# Patient Record
Sex: FEMALE | URBAN - METROPOLITAN AREA
[De-identification: names, ages, dates, MRNs, and addresses within clinical notes are randomized per-mention and may not be internally consistent; named-entity substitution may affect disease eponyms.]

---

## 2024-08-31 ENCOUNTER — APPOINTMENT (OUTPATIENT)
Dept: RADIOLOGY | Facility: MEDICAL CENTER | Age: 49
DRG: 982 | End: 2024-08-31
Attending: STUDENT IN AN ORGANIZED HEALTH CARE EDUCATION/TRAINING PROGRAM
Payer: COMMERCIAL

## 2024-08-31 ENCOUNTER — APPOINTMENT (OUTPATIENT)
Dept: RADIOLOGY | Facility: MEDICAL CENTER | Age: 49
DRG: 982 | End: 2024-08-31
Payer: COMMERCIAL

## 2024-08-31 ENCOUNTER — HOSPITAL ENCOUNTER (INPATIENT)
Facility: MEDICAL CENTER | Age: 49
End: 2024-08-31
Attending: STUDENT IN AN ORGANIZED HEALTH CARE EDUCATION/TRAINING PROGRAM | Admitting: STUDENT IN AN ORGANIZED HEALTH CARE EDUCATION/TRAINING PROGRAM

## 2024-08-31 VITALS
OXYGEN SATURATION: 92 % | SYSTOLIC BLOOD PRESSURE: 130 MMHG | WEIGHT: 220 LBS | RESPIRATION RATE: 24 BRPM | DIASTOLIC BLOOD PRESSURE: 97 MMHG | BODY MASS INDEX: 36.65 KG/M2 | HEIGHT: 65 IN | TEMPERATURE: 96.3 F | HEART RATE: 77 BPM

## 2024-08-31 DIAGNOSIS — R09.02 HYPOXIA: ICD-10-CM

## 2024-08-31 DIAGNOSIS — S32.009A CLOSED FRACTURE DISLOCATION OF LUMBAR SPINE, INITIAL ENCOUNTER (HCC): ICD-10-CM

## 2024-08-31 DIAGNOSIS — S22.42XA CLOSED FRACTURE OF MULTIPLE RIBS OF LEFT SIDE, INITIAL ENCOUNTER: ICD-10-CM

## 2024-08-31 DIAGNOSIS — Z78.9 NO CONTRAINDICATION TO DEEP VEIN THROMBOSIS (DVT) PROPHYLAXIS: ICD-10-CM

## 2024-08-31 DIAGNOSIS — J43.9 PULMONARY EMPHYSEMA, UNSPECIFIED EMPHYSEMA TYPE (HCC): ICD-10-CM

## 2024-08-31 DIAGNOSIS — S82.141A CLOSED FRACTURE OF RIGHT TIBIAL PLATEAU, INITIAL ENCOUNTER: ICD-10-CM

## 2024-08-31 DIAGNOSIS — S27.0XXA TRAUMATIC PNEUMOTHORAX, INITIAL ENCOUNTER: ICD-10-CM

## 2024-08-31 PROBLEM — S82.143A TIBIAL PLATEAU FRACTURE: Status: ACTIVE | Noted: 2024-08-31

## 2024-08-31 PROBLEM — K76.0 FATTY LIVER: Status: ACTIVE | Noted: 2024-08-31

## 2024-08-31 PROBLEM — T14.90XA TRAUMA: Status: ACTIVE | Noted: 2024-08-31

## 2024-08-31 PROBLEM — S22.49XA CLOSED FRACTURE OF MULTIPLE RIBS: Status: ACTIVE | Noted: 2024-08-31

## 2024-08-31 PROBLEM — S22.009A FRACTURE OF THORACIC TRANSVERSE PROCESS (HCC): Status: ACTIVE | Noted: 2024-08-31

## 2024-08-31 PROBLEM — S22.009A FRACTURE OF THORACIC TRANSVERSE PROCESS (HCC): Status: RESOLVED | Noted: 2024-08-31 | Resolved: 2024-08-31

## 2024-08-31 LAB
ABO GROUP BLD: NORMAL
ALBUMIN SERPL BCP-MCNC: 4 G/DL (ref 3.2–4.9)
ALBUMIN/GLOB SERPL: 1.5 G/DL
ALP SERPL-CCNC: 72 U/L (ref 30–99)
ALT SERPL-CCNC: 18 U/L (ref 2–50)
ANION GAP SERPL CALC-SCNC: 12 MMOL/L (ref 7–16)
APTT PPP: 23 SEC (ref 24.7–36)
AST SERPL-CCNC: 26 U/L (ref 12–45)
BILIRUB SERPL-MCNC: 0.2 MG/DL (ref 0.1–1.5)
BLD GP AB SCN SERPL QL: NORMAL
BUN SERPL-MCNC: 13 MG/DL (ref 8–22)
CALCIUM ALBUM COR SERPL-MCNC: 8.7 MG/DL (ref 8.5–10.5)
CALCIUM SERPL-MCNC: 8.7 MG/DL (ref 8.5–10.5)
CHLORIDE SERPL-SCNC: 108 MMOL/L (ref 96–112)
CO2 SERPL-SCNC: 22 MMOL/L (ref 20–33)
CREAT SERPL-MCNC: 1.1 MG/DL (ref 0.5–1.4)
ERYTHROCYTE [DISTWIDTH] IN BLOOD BY AUTOMATED COUNT: 49.6 FL (ref 35.9–50)
ETHANOL BLD-MCNC: <10.1 MG/DL
GFR SERPLBLD CREATININE-BSD FMLA CKD-EPI: 61 ML/MIN/1.73 M 2
GLOBULIN SER CALC-MCNC: 2.6 G/DL (ref 1.9–3.5)
GLUCOSE SERPL-MCNC: 130 MG/DL (ref 65–99)
HCG SERPL QL: NEGATIVE
HCT VFR BLD AUTO: 40.7 % (ref 37–47)
HGB BLD-MCNC: 13.7 G/DL (ref 12–16)
INR PPP: 1 (ref 0.87–1.13)
MCH RBC QN AUTO: 30.2 PG (ref 27–33)
MCHC RBC AUTO-ENTMCNC: 33.7 G/DL (ref 32.2–35.5)
MCV RBC AUTO: 89.6 FL (ref 81.4–97.8)
PLATELET # BLD AUTO: 309 K/UL (ref 164–446)
PMV BLD AUTO: 10.4 FL (ref 9–12.9)
POTASSIUM SERPL-SCNC: 3.7 MMOL/L (ref 3.6–5.5)
PROT SERPL-MCNC: 6.6 G/DL (ref 6–8.2)
PROTHROMBIN TIME: 13.3 SEC (ref 12–14.6)
RBC # BLD AUTO: 4.54 M/UL (ref 4.2–5.4)
RH BLD: NORMAL
SODIUM SERPL-SCNC: 142 MMOL/L (ref 135–145)
WBC # BLD AUTO: 17 K/UL (ref 4.8–10.8)

## 2024-08-31 PROCEDURE — 99291 CRITICAL CARE FIRST HOUR: CPT

## 2024-08-31 PROCEDURE — 86901 BLOOD TYPING SEROLOGIC RH(D): CPT

## 2024-08-31 PROCEDURE — 71260 CT THORAX DX C+: CPT

## 2024-08-31 PROCEDURE — 80053 COMPREHEN METABOLIC PANEL: CPT

## 2024-08-31 PROCEDURE — 700105 HCHG RX REV CODE 258: Performed by: NURSE PRACTITIONER

## 2024-08-31 PROCEDURE — 85730 THROMBOPLASTIN TIME PARTIAL: CPT

## 2024-08-31 PROCEDURE — 700101 HCHG RX REV CODE 250: Performed by: NURSE PRACTITIONER

## 2024-08-31 PROCEDURE — 99222 1ST HOSP IP/OBS MODERATE 55: CPT | Performed by: STUDENT IN AN ORGANIZED HEALTH CARE EDUCATION/TRAINING PROGRAM

## 2024-08-31 PROCEDURE — G0390 TRAUMA RESPONS W/HOSP CRITI: HCPCS

## 2024-08-31 PROCEDURE — 84703 CHORIONIC GONADOTROPIN ASSAY: CPT

## 2024-08-31 PROCEDURE — 72131 CT LUMBAR SPINE W/O DYE: CPT

## 2024-08-31 PROCEDURE — 70450 CT HEAD/BRAIN W/O DYE: CPT

## 2024-08-31 PROCEDURE — 700111 HCHG RX REV CODE 636 W/ 250 OVERRIDE (IP): Mod: JZ | Performed by: STUDENT IN AN ORGANIZED HEALTH CARE EDUCATION/TRAINING PROGRAM

## 2024-08-31 PROCEDURE — 86850 RBC ANTIBODY SCREEN: CPT

## 2024-08-31 PROCEDURE — 96374 THER/PROPH/DIAG INJ IV PUSH: CPT

## 2024-08-31 PROCEDURE — 700117 HCHG RX CONTRAST REV CODE 255: Performed by: STUDENT IN AN ORGANIZED HEALTH CARE EDUCATION/TRAINING PROGRAM

## 2024-08-31 PROCEDURE — 73600 X-RAY EXAM OF ANKLE: CPT | Mod: RT

## 2024-08-31 PROCEDURE — 73560 X-RAY EXAM OF KNEE 1 OR 2: CPT | Mod: RT

## 2024-08-31 PROCEDURE — 72128 CT CHEST SPINE W/O DYE: CPT

## 2024-08-31 PROCEDURE — 73700 CT LOWER EXTREMITY W/O DYE: CPT | Mod: RT

## 2024-08-31 PROCEDURE — 86900 BLOOD TYPING SEROLOGIC ABO: CPT

## 2024-08-31 PROCEDURE — A9270 NON-COVERED ITEM OR SERVICE: HCPCS | Performed by: NURSE PRACTITIONER

## 2024-08-31 PROCEDURE — 700102 HCHG RX REV CODE 250 W/ 637 OVERRIDE(OP): Performed by: NURSE PRACTITIONER

## 2024-08-31 PROCEDURE — 72125 CT NECK SPINE W/O DYE: CPT

## 2024-08-31 PROCEDURE — 36415 COLL VENOUS BLD VENIPUNCTURE: CPT

## 2024-08-31 PROCEDURE — 85027 COMPLETE CBC AUTOMATED: CPT

## 2024-08-31 PROCEDURE — 770022 HCHG ROOM/CARE - ICU (200)

## 2024-08-31 PROCEDURE — 85610 PROTHROMBIN TIME: CPT

## 2024-08-31 PROCEDURE — 82077 ASSAY SPEC XCP UR&BREATH IA: CPT

## 2024-08-31 PROCEDURE — 73590 X-RAY EXAM OF LOWER LEG: CPT | Mod: LT

## 2024-08-31 PROCEDURE — 71045 X-RAY EXAM CHEST 1 VIEW: CPT

## 2024-08-31 RX ORDER — AMOXICILLIN 250 MG
1 CAPSULE ORAL
Status: DISCONTINUED | OUTPATIENT
Start: 2024-08-31 | End: 2024-09-09 | Stop reason: HOSPADM

## 2024-08-31 RX ORDER — ACETAMINOPHEN 500 MG
1000 TABLET ORAL EVERY 6 HOURS
Status: DISPENSED | OUTPATIENT
Start: 2024-08-31 | End: 2024-09-05

## 2024-08-31 RX ORDER — HYDROMORPHONE HYDROCHLORIDE 1 MG/ML
.5-1 INJECTION, SOLUTION INTRAMUSCULAR; INTRAVENOUS; SUBCUTANEOUS
Status: DISCONTINUED | OUTPATIENT
Start: 2024-08-31 | End: 2024-09-05

## 2024-08-31 RX ORDER — ONDANSETRON 2 MG/ML
4 INJECTION INTRAMUSCULAR; INTRAVENOUS EVERY 4 HOURS PRN
Status: DISCONTINUED | OUTPATIENT
Start: 2024-08-31 | End: 2024-09-09 | Stop reason: HOSPADM

## 2024-08-31 RX ORDER — GABAPENTIN 100 MG/1
100 CAPSULE ORAL EVERY 8 HOURS
Status: DISCONTINUED | OUTPATIENT
Start: 2024-08-31 | End: 2024-09-01

## 2024-08-31 RX ORDER — SODIUM CHLORIDE, SODIUM LACTATE, POTASSIUM CHLORIDE, CALCIUM CHLORIDE 600; 310; 30; 20 MG/100ML; MG/100ML; MG/100ML; MG/100ML
INJECTION, SOLUTION INTRAVENOUS CONTINUOUS
Status: DISCONTINUED | OUTPATIENT
Start: 2024-08-31 | End: 2024-09-01

## 2024-08-31 RX ORDER — ONDANSETRON 4 MG/1
4 TABLET, ORALLY DISINTEGRATING ORAL EVERY 4 HOURS PRN
Status: DISCONTINUED | OUTPATIENT
Start: 2024-08-31 | End: 2024-09-09 | Stop reason: HOSPADM

## 2024-08-31 RX ORDER — CELECOXIB 100 MG/1
200 CAPSULE ORAL DAILY
Status: COMPLETED | OUTPATIENT
Start: 2024-09-01 | End: 2024-09-05

## 2024-08-31 RX ORDER — METAXALONE 800 MG/1
800 TABLET ORAL 3 TIMES DAILY
Status: DISCONTINUED | OUTPATIENT
Start: 2024-08-31 | End: 2024-09-09 | Stop reason: HOSPADM

## 2024-08-31 RX ORDER — ACETAMINOPHEN 500 MG
1000 TABLET ORAL EVERY 6 HOURS PRN
Status: DISCONTINUED | OUTPATIENT
Start: 2024-09-05 | End: 2024-09-09 | Stop reason: HOSPADM

## 2024-08-31 RX ORDER — BISACODYL 10 MG
10 SUPPOSITORY, RECTAL RECTAL
Status: DISCONTINUED | OUTPATIENT
Start: 2024-08-31 | End: 2024-09-09 | Stop reason: HOSPADM

## 2024-08-31 RX ORDER — DOCUSATE SODIUM 100 MG/1
100 CAPSULE, LIQUID FILLED ORAL 2 TIMES DAILY
Status: DISCONTINUED | OUTPATIENT
Start: 2024-08-31 | End: 2024-09-09 | Stop reason: HOSPADM

## 2024-08-31 RX ORDER — POLYETHYLENE GLYCOL 3350 17 G/17G
1 POWDER, FOR SOLUTION ORAL 2 TIMES DAILY
Status: DISCONTINUED | OUTPATIENT
Start: 2024-08-31 | End: 2024-09-09 | Stop reason: HOSPADM

## 2024-08-31 RX ORDER — CELECOXIB 200 MG/1
200 CAPSULE ORAL
Status: DISCONTINUED | OUTPATIENT
Start: 2024-09-06 | End: 2024-09-09 | Stop reason: HOSPADM

## 2024-08-31 RX ORDER — OXYCODONE HYDROCHLORIDE 10 MG/1
10 TABLET ORAL
Status: DISCONTINUED | OUTPATIENT
Start: 2024-08-31 | End: 2024-09-09 | Stop reason: HOSPADM

## 2024-08-31 RX ORDER — LIDOCAINE 4 G/G
1-3 PATCH TOPICAL EVERY 24 HOURS
Status: DISCONTINUED | OUTPATIENT
Start: 2024-08-31 | End: 2024-09-09 | Stop reason: HOSPADM

## 2024-08-31 RX ORDER — SODIUM PHOSPHATE,MONO-DIBASIC 19G-7G/118
1 ENEMA (ML) RECTAL
Status: DISCONTINUED | OUTPATIENT
Start: 2024-08-31 | End: 2024-09-09 | Stop reason: HOSPADM

## 2024-08-31 RX ORDER — ENOXAPARIN SODIUM 100 MG/ML
40 INJECTION SUBCUTANEOUS DAILY
Status: DISCONTINUED | OUTPATIENT
Start: 2024-09-01 | End: 2024-09-01

## 2024-08-31 RX ORDER — AMOXICILLIN 250 MG
1 CAPSULE ORAL NIGHTLY
Status: DISCONTINUED | OUTPATIENT
Start: 2024-08-31 | End: 2024-09-09 | Stop reason: HOSPADM

## 2024-08-31 RX ORDER — OXYCODONE HYDROCHLORIDE 5 MG/1
5 TABLET ORAL
Status: DISCONTINUED | OUTPATIENT
Start: 2024-08-31 | End: 2024-09-09 | Stop reason: HOSPADM

## 2024-08-31 RX ADMIN — OXYCODONE HYDROCHLORIDE 10 MG: 10 TABLET ORAL at 19:21

## 2024-08-31 RX ADMIN — ACETAMINOPHEN 1000 MG: 500 TABLET ORAL at 19:20

## 2024-08-31 RX ADMIN — METAXALONE 800 MG: 800 TABLET ORAL at 19:21

## 2024-08-31 RX ADMIN — GABAPENTIN 100 MG: 100 CAPSULE ORAL at 22:26

## 2024-08-31 RX ADMIN — LIDOCAINE 3 PATCH: 4 PATCH TOPICAL at 19:37

## 2024-08-31 RX ADMIN — IOHEXOL 100 ML: 350 INJECTION, SOLUTION INTRAVENOUS at 17:25

## 2024-08-31 RX ADMIN — FENTANYL CITRATE 50 MCG: 50 INJECTION, SOLUTION INTRAMUSCULAR; INTRAVENOUS at 18:11

## 2024-08-31 RX ADMIN — SODIUM CHLORIDE, POTASSIUM CHLORIDE, SODIUM LACTATE AND CALCIUM CHLORIDE: 600; 310; 30; 20 INJECTION, SOLUTION INTRAVENOUS at 19:43

## 2024-08-31 ASSESSMENT — PAIN DESCRIPTION - PAIN TYPE
TYPE: ACUTE PAIN

## 2024-09-01 ENCOUNTER — APPOINTMENT (OUTPATIENT)
Dept: RADIOLOGY | Facility: MEDICAL CENTER | Age: 49
DRG: 982 | End: 2024-09-01
Payer: COMMERCIAL

## 2024-09-01 ENCOUNTER — APPOINTMENT (OUTPATIENT)
Dept: RADIOLOGY | Facility: MEDICAL CENTER | Age: 49
DRG: 982 | End: 2024-09-01
Attending: NURSE PRACTITIONER
Payer: COMMERCIAL

## 2024-09-01 PROBLEM — J44.9 COPD (CHRONIC OBSTRUCTIVE PULMONARY DISEASE) (HCC): Status: ACTIVE | Noted: 2024-09-01

## 2024-09-01 LAB
ABO + RH BLD: NORMAL
ALBUMIN SERPL BCP-MCNC: 3.6 G/DL (ref 3.2–4.9)
ALBUMIN/GLOB SERPL: 1.4 G/DL
ALP SERPL-CCNC: 62 U/L (ref 30–99)
ALT SERPL-CCNC: 18 U/L (ref 2–50)
ANION GAP SERPL CALC-SCNC: 11 MMOL/L (ref 7–16)
AST SERPL-CCNC: 23 U/L (ref 12–45)
BASOPHILS # BLD AUTO: 0.1 % (ref 0–1.8)
BASOPHILS # BLD: 0.02 K/UL (ref 0–0.12)
BILIRUB SERPL-MCNC: 0.3 MG/DL (ref 0.1–1.5)
BUN SERPL-MCNC: 13 MG/DL (ref 8–22)
CALCIUM ALBUM COR SERPL-MCNC: 8.8 MG/DL (ref 8.5–10.5)
CALCIUM SERPL-MCNC: 8.5 MG/DL (ref 8.5–10.5)
CHLORIDE SERPL-SCNC: 106 MMOL/L (ref 96–112)
CO2 SERPL-SCNC: 23 MMOL/L (ref 20–33)
CREAT SERPL-MCNC: 0.62 MG/DL (ref 0.5–1.4)
EOSINOPHIL # BLD AUTO: 0.01 K/UL (ref 0–0.51)
EOSINOPHIL NFR BLD: 0.1 % (ref 0–6.9)
ERYTHROCYTE [DISTWIDTH] IN BLOOD BY AUTOMATED COUNT: 51.4 FL (ref 35.9–50)
GFR SERPLBLD CREATININE-BSD FMLA CKD-EPI: 107 ML/MIN/1.73 M 2
GLOBULIN SER CALC-MCNC: 2.5 G/DL (ref 1.9–3.5)
GLUCOSE SERPL-MCNC: 116 MG/DL (ref 65–99)
HCT VFR BLD AUTO: 38.2 % (ref 37–47)
HGB BLD-MCNC: 12.4 G/DL (ref 12–16)
IMM GRANULOCYTES # BLD AUTO: 0.07 K/UL (ref 0–0.11)
IMM GRANULOCYTES NFR BLD AUTO: 0.5 % (ref 0–0.9)
LYMPHOCYTES # BLD AUTO: 2.42 K/UL (ref 1–4.8)
LYMPHOCYTES NFR BLD: 15.6 % (ref 22–41)
MCH RBC QN AUTO: 29.7 PG (ref 27–33)
MCHC RBC AUTO-ENTMCNC: 32.5 G/DL (ref 32.2–35.5)
MCV RBC AUTO: 91.6 FL (ref 81.4–97.8)
MONOCYTES # BLD AUTO: 0.95 K/UL (ref 0–0.85)
MONOCYTES NFR BLD AUTO: 6.1 % (ref 0–13.4)
NEUTROPHILS # BLD AUTO: 12.02 K/UL (ref 1.82–7.42)
NEUTROPHILS NFR BLD: 77.6 % (ref 44–72)
NRBC # BLD AUTO: 0 K/UL
NRBC BLD-RTO: 0 /100 WBC (ref 0–0.2)
PLATELET # BLD AUTO: 233 K/UL (ref 164–446)
PMV BLD AUTO: 10.3 FL (ref 9–12.9)
POTASSIUM SERPL-SCNC: 3.6 MMOL/L (ref 3.6–5.5)
PROT SERPL-MCNC: 6.1 G/DL (ref 6–8.2)
RBC # BLD AUTO: 4.17 M/UL (ref 4.2–5.4)
SODIUM SERPL-SCNC: 140 MMOL/L (ref 135–145)
WBC # BLD AUTO: 15.5 K/UL (ref 4.8–10.8)

## 2024-09-01 PROCEDURE — 700102 HCHG RX REV CODE 250 W/ 637 OVERRIDE(OP): Performed by: NURSE PRACTITIONER

## 2024-09-01 PROCEDURE — 94640 AIRWAY INHALATION TREATMENT: CPT

## 2024-09-01 PROCEDURE — 94669 MECHANICAL CHEST WALL OSCILL: CPT

## 2024-09-01 PROCEDURE — 700101 HCHG RX REV CODE 250: Performed by: NURSE PRACTITIONER

## 2024-09-01 PROCEDURE — 99152 MOD SED SAME PHYS/QHP 5/>YRS: CPT

## 2024-09-01 PROCEDURE — 700102 HCHG RX REV CODE 250 W/ 637 OVERRIDE(OP): Performed by: REGISTERED NURSE

## 2024-09-01 PROCEDURE — 99233 SBSQ HOSP IP/OBS HIGH 50: CPT | Mod: FS,25 | Performed by: SURGERY

## 2024-09-01 PROCEDURE — 700111 HCHG RX REV CODE 636 W/ 250 OVERRIDE (IP): Performed by: SURGERY

## 2024-09-01 PROCEDURE — 32551 INSERTION OF CHEST TUBE: CPT

## 2024-09-01 PROCEDURE — C1729 CATH, DRAINAGE: HCPCS

## 2024-09-01 PROCEDURE — 32551 INSERTION OF CHEST TUBE: CPT | Performed by: SURGERY

## 2024-09-01 PROCEDURE — 85025 COMPLETE CBC W/AUTO DIFF WBC: CPT

## 2024-09-01 PROCEDURE — 80053 COMPREHEN METABOLIC PANEL: CPT

## 2024-09-01 PROCEDURE — 700111 HCHG RX REV CODE 636 W/ 250 OVERRIDE (IP)

## 2024-09-01 PROCEDURE — 700111 HCHG RX REV CODE 636 W/ 250 OVERRIDE (IP): Mod: JZ | Performed by: SURGERY

## 2024-09-01 PROCEDURE — 700105 HCHG RX REV CODE 258

## 2024-09-01 PROCEDURE — A9270 NON-COVERED ITEM OR SERVICE: HCPCS | Performed by: REGISTERED NURSE

## 2024-09-01 PROCEDURE — 700105 HCHG RX REV CODE 258: Performed by: NURSE PRACTITIONER

## 2024-09-01 PROCEDURE — 0W9B30Z DRAINAGE OF LEFT PLEURAL CAVITY WITH DRAINAGE DEVICE, PERCUTANEOUS APPROACH: ICD-10-PCS | Performed by: SURGERY

## 2024-09-01 PROCEDURE — A9270 NON-COVERED ITEM OR SERVICE: HCPCS | Performed by: NURSE PRACTITIONER

## 2024-09-01 PROCEDURE — 770022 HCHG ROOM/CARE - ICU (200)

## 2024-09-01 PROCEDURE — 71045 X-RAY EXAM CHEST 1 VIEW: CPT

## 2024-09-01 RX ORDER — GABAPENTIN 100 MG/1
200 CAPSULE ORAL EVERY 8 HOURS
Status: DISCONTINUED | OUTPATIENT
Start: 2024-09-01 | End: 2024-09-03

## 2024-09-01 RX ORDER — PROPOFOL 10 MG/ML
100 INJECTION, EMULSION INTRAVENOUS ONCE
Status: COMPLETED | OUTPATIENT
Start: 2024-09-01 | End: 2024-09-01

## 2024-09-01 RX ORDER — ENOXAPARIN SODIUM 100 MG/ML
40 INJECTION SUBCUTANEOUS EVERY 12 HOURS
Status: DISCONTINUED | OUTPATIENT
Start: 2024-09-01 | End: 2024-09-09 | Stop reason: HOSPADM

## 2024-09-01 RX ORDER — NICOTINE 21 MG/24HR
21 PATCH, TRANSDERMAL 24 HOURS TRANSDERMAL
Status: DISCONTINUED | OUTPATIENT
Start: 2024-09-01 | End: 2024-09-09 | Stop reason: HOSPADM

## 2024-09-01 RX ADMIN — ACETAMINOPHEN 1000 MG: 500 TABLET ORAL at 05:54

## 2024-09-01 RX ADMIN — METAXALONE 800 MG: 800 TABLET ORAL at 05:54

## 2024-09-01 RX ADMIN — METAXALONE 800 MG: 800 TABLET ORAL at 17:33

## 2024-09-01 RX ADMIN — OXYCODONE HYDROCHLORIDE 10 MG: 10 TABLET ORAL at 01:03

## 2024-09-01 RX ADMIN — DOCUSATE SODIUM 100 MG: 100 CAPSULE, LIQUID FILLED ORAL at 17:33

## 2024-09-01 RX ADMIN — GABAPENTIN 200 MG: 100 CAPSULE ORAL at 14:15

## 2024-09-01 RX ADMIN — GABAPENTIN 100 MG: 100 CAPSULE ORAL at 05:54

## 2024-09-01 RX ADMIN — ACETAMINOPHEN 1000 MG: 500 TABLET ORAL at 23:48

## 2024-09-01 RX ADMIN — LIDOCAINE 3 PATCH: 4 PATCH TOPICAL at 17:32

## 2024-09-01 RX ADMIN — NICOTINE TRANSDERMAL SYSTEM 21 MG: 21 PATCH, EXTENDED RELEASE TRANSDERMAL at 09:48

## 2024-09-01 RX ADMIN — ENOXAPARIN SODIUM 40 MG: 100 INJECTION SUBCUTANEOUS at 17:32

## 2024-09-01 RX ADMIN — PROPOFOL 100 MG: 10 INJECTION, EMULSION INTRAVENOUS at 04:27

## 2024-09-01 RX ADMIN — SODIUM CHLORIDE, POTASSIUM CHLORIDE, SODIUM LACTATE AND CALCIUM CHLORIDE: 600; 310; 30; 20 INJECTION, SOLUTION INTRAVENOUS at 09:56

## 2024-09-01 RX ADMIN — ACETAMINOPHEN 1000 MG: 500 TABLET ORAL at 00:53

## 2024-09-01 RX ADMIN — CEFAZOLIN 2 G: 2 INJECTION, POWDER, FOR SOLUTION INTRAMUSCULAR; INTRAVENOUS at 05:58

## 2024-09-01 RX ADMIN — METAXALONE 800 MG: 800 TABLET ORAL at 12:15

## 2024-09-01 RX ADMIN — CELECOXIB 200 MG: 100 CAPSULE ORAL at 05:54

## 2024-09-01 RX ADMIN — OXYCODONE HYDROCHLORIDE 10 MG: 10 TABLET ORAL at 21:25

## 2024-09-01 RX ADMIN — OXYCODONE HYDROCHLORIDE 10 MG: 10 TABLET ORAL at 09:54

## 2024-09-01 RX ADMIN — ACETAMINOPHEN 1000 MG: 500 TABLET ORAL at 12:15

## 2024-09-01 RX ADMIN — MAGNESIUM HYDROXIDE 30 ML: 1200 LIQUID ORAL at 17:33

## 2024-09-01 RX ADMIN — ENOXAPARIN SODIUM 40 MG: 100 INJECTION SUBCUTANEOUS at 09:48

## 2024-09-01 RX ADMIN — POLYETHYLENE GLYCOL 3350 1 PACKET: 17 POWDER, FOR SOLUTION ORAL at 17:33

## 2024-09-01 RX ADMIN — GABAPENTIN 200 MG: 100 CAPSULE ORAL at 21:24

## 2024-09-01 ASSESSMENT — LIFESTYLE VARIABLES
HAVE PEOPLE ANNOYED YOU BY CRITICIZING YOUR DRINKING: NO
HOW MANY TIMES IN THE PAST YEAR HAVE YOU HAD 5 OR MORE DRINKS IN A DAY: 0
CONSUMPTION TOTAL: NEGATIVE
AVERAGE NUMBER OF DAYS PER WEEK YOU HAVE A DRINK CONTAINING ALCOHOL: 3
HAVE YOU EVER FELT YOU SHOULD CUT DOWN ON YOUR DRINKING: NO
ALCOHOL_USE: YES
TOTAL SCORE: 0
TOTAL SCORE: 0
ON A TYPICAL DAY WHEN YOU DRINK ALCOHOL HOW MANY DRINKS DO YOU HAVE: 2
EVER FELT BAD OR GUILTY ABOUT YOUR DRINKING: NO
TOTAL SCORE: 0
EVER HAD A DRINK FIRST THING IN THE MORNING TO STEADY YOUR NERVES TO GET RID OF A HANGOVER: NO

## 2024-09-01 ASSESSMENT — COGNITIVE AND FUNCTIONAL STATUS - GENERAL
DRESSING REGULAR LOWER BODY CLOTHING: A LOT
DRESSING REGULAR UPPER BODY CLOTHING: A LITTLE
HELP NEEDED FOR BATHING: A LITTLE
TOILETING: A LITTLE
SUGGESTED CMS G CODE MODIFIER MOBILITY: CL
TURNING FROM BACK TO SIDE WHILE IN FLAT BAD: A LOT
SUGGESTED CMS G CODE MODIFIER DAILY ACTIVITY: CK
MOVING TO AND FROM BED TO CHAIR: A LOT
MOBILITY SCORE: 13
WALKING IN HOSPITAL ROOM: A LOT
DAILY ACTIVITIY SCORE: 19
MOVING FROM LYING ON BACK TO SITTING ON SIDE OF FLAT BED: A LITTLE
STANDING UP FROM CHAIR USING ARMS: A LOT
CLIMB 3 TO 5 STEPS WITH RAILING: A LOT

## 2024-09-01 ASSESSMENT — PATIENT HEALTH QUESTIONNAIRE - PHQ9
1. LITTLE INTEREST OR PLEASURE IN DOING THINGS: NOT AT ALL
2. FEELING DOWN, DEPRESSED, IRRITABLE, OR HOPELESS: NOT AT ALL
SUM OF ALL RESPONSES TO PHQ9 QUESTIONS 1 AND 2: 0

## 2024-09-01 ASSESSMENT — PAIN DESCRIPTION - PAIN TYPE
TYPE: ACUTE PAIN

## 2024-09-01 ASSESSMENT — ENCOUNTER SYMPTOMS
CONSTITUTIONAL NEGATIVE: 1
EYES NEGATIVE: 1
FOCAL WEAKNESS: 0
PSYCHIATRIC NEGATIVE: 1
MUSCULOSKELETAL NEGATIVE: 1
DOUBLE VISION: 0
GASTROINTESTINAL NEGATIVE: 1
CARDIOVASCULAR NEGATIVE: 1
BLURRED VISION: 0
NEUROLOGICAL NEGATIVE: 1
DIZZINESS: 0
RESPIRATORY NEGATIVE: 1

## 2024-09-01 ASSESSMENT — COPD QUESTIONNAIRES
DO YOU EVER COUGH UP ANY MUCUS OR PHLEGM?: YES, A FEW DAYS A WEEK OR MONTH
DURING THE PAST 4 WEEKS HOW MUCH DID YOU FEEL SHORT OF BREATH: SOME OF THE TIME

## 2024-09-01 NOTE — ED NOTES
Knee immobilizer placed R leg with assist from RN and tech.   ICU RN at bedside picking up pt with all belongings now.   Jewelry placed in cup with pt sticker on it and placed into pt belongings bag.

## 2024-09-01 NOTE — PROGRESS NOTES
Pt to TICU with TICU RN at 1833    HR: 80  BP: 123/75  SpO2: 93% on 2L NC  RR: 22  Temp: 96.8F  Weight: 100 kg *pt stated*    4 Eyes Skin Assessment Completed by Darlene RN and ROSY Alcazar.    Head WDL  Ears WDL  Nose WDL  Mouth WDL  Neck R neck lipoma, red & blanching  Breast/Chest abrasion to R shoulder  Shoulder Blades WDL  Spine WDL  (R) Arm/Elbow/Hand WDL  (L) Arm/Elbow/Hand small scattered abrasions  Abdomen WDL  Groin WDL  Scrotum/Coccyx/Buttocks WDL  (R) Leg Bruising to shin; scar to R knee; immobilizer in place  (L) Leg scattered abrasions  (R) Heel/Foot/Toe healing abrasion to heel, new abrasion to heel, purple/red & blanching  (L) Heel/Foot/Toe WDL        Devices In Places ECG, Blood Pressure Cuff, Pulse Ox, SCD's, Leg Immobilizer, and Nasal Cannula      Interventions In Place Gray Ear Foams, Heel Mepilex, Sacral Mepilex, TAP System, Pillows, Q2 Turns, Dri-Lauri Pads, and Pressure Redistribution Mattress    Possible Skin Injury No    Pictures Uploaded Into Epic Yes  Wound Consult Placed Yes  RN Wound Prevention Protocol Ordered Yes
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
## 2024-09-01 NOTE — PROGRESS NOTES
I was paged at [phone] to consult on this patient. I arrived at the patient's bedside at 1700.  - right depressed lateral tibial plateau fracture  - definitive plan pending  - Discussed with Dr. Forbes, formal consult to follow

## 2024-09-01 NOTE — ASSESSMENT & PLAN NOTE
Impacted and comminuted lateral tibial plateau fracture. CT showing acute comminuted and depressed fracture of the lateral tibial plateau, extending into the tibial eminence.   Definitive operative reduction and stabilization pending.  Knee immobilizer   Weight bearing status - Definitive plan pending RLE.  Raymond Forbes MD. Orthopedic Surgeon. [facility]. and Juwan Wu MD. Orthopedic Surgeon. [facility].

## 2024-09-01 NOTE — ASSESSMENT & PLAN NOTE
History of COPD reported by patient.  Does not have a pulmonologist. Does not take medications or utilize oxygen.  Home oxygen ordered.   Follow up outpatient with PCP for pulmonary referral.
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
## 2024-09-01 NOTE — CARE PLAN
The patient is Watcher - Medium risk of patient condition declining or worsening    Shift Goals  Clinical Goals: pain management, pulmonary hygiene  Patient Goals: pain managemnet, rest, updates  Family Goals: updates on plan of care    Progress made toward(s) clinical / shift goals:    Problem: Knowledge Deficit - Standard  Goal: Patient and family/care givers will demonstrate understanding of plan of care, disease process/condition, diagnostic tests and medications  Outcome: Progressing     Problem: Pain - Standard  Goal: Alleviation of pain or a reduction in pain to the patient’s comfort goal  Outcome: Progressing     Problem: Fall Risk  Goal: Patient will remain free from falls  Outcome: Progressing     Problem: Communication  Goal: The ability to communicate needs accurately and effectively will improve  Outcome: Progressing     Problem: Chest Tube Management  Goal: Complications related to chest tube will be avoided or minimized  Outcome: Progressing       Patient is not progressing towards the following goals:

## 2024-09-01 NOTE — ED NOTES
Knee immobilizer applied to injured extremity.  CMS checked before and after application. Pt educated on use and care. Pt verbalized understanding.

## 2024-09-01 NOTE — CARE PLAN
The patient is Watcher - Medium risk of patient condition declining or worsening    Shift Goals  Clinical Goals: Pain control, pulmonary hygiene, increase mobility tolerance  Patient Goals: Pain control  Family Goals: MARYAM, none present    Progress made toward(s) clinical / shift goals:    Problem: Knowledge Deficit - Standard  Goal: Patient and family/care givers will demonstrate understanding of plan of care, disease process/condition, diagnostic tests and medications  Outcome: Progressing     Problem: Pain - Standard  Goal: Alleviation of pain or a reduction in pain to the patient’s comfort goal  Outcome: Progressing     Problem: Fall Risk  Goal: Patient will remain free from falls  Outcome: Progressing     Problem: Chest Tube Management  Goal: Complications related to chest tube will be avoided or minimized  Outcome: Progressing     Problem: Skin Integrity  Goal: Skin integrity is maintained or improved  Outcome: Progressing

## 2024-09-01 NOTE — ED NOTES
Bedside report to TICU RN. Pt transported to TICU 927 on monitor. All belongings transported with pt.

## 2024-09-01 NOTE — ASSESSMENT & PLAN NOTE
Fractures of the left anterior sixth and seventh ribs and the left posterior sixth, seventh, eighth, ninth, 10th and 11th ribs..  Aggressive multimodal pain management and pulmonary hygiene. Serial chest radiographs.
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
## 2024-09-01 NOTE — PROGRESS NOTES
0400 Preparing for chest tube placement    0408 30mg propofol given  0410 additional 20mg propofol given  0413 local administered  0414 20mg propofol given  0417 left chest tube placement  0417 20mg propofol given  0419 10mg propofol given    See vital sign flowsheet and procedure navigator for further detail.
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
## 2024-09-01 NOTE — ASSESSMENT & PLAN NOTE
Small left-sided pneumothorax   No chest tube required on admit  Aggressive pulmonary hygiene   Daily chest Xray
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
## 2024-09-01 NOTE — PROGRESS NOTES
0100: pt on 5L nasal canula saturation 88-91%.   0200: Pt O2 demands increasing, O2 saturation 85-87% on 5L nasal canula. Pt repositioned and HOB elevated. Pt encourage to cough and use incentive spirometer. Pt placed on 10L oxy mask, O2 saturation 89-91%.  0240: Pt O2 demands continuing to increase, O2 saturation 83-85%. Pt placed on 15L oxy mask, O2 saturation 89-92%. RT notified.   0300: Pt O2 demands continuing to increase, O2 saturation 84-87%. Pt placed on 15L non-rebreather, O2 saturation 92-97%.RT updated.   0332: Pt on 15L non-rebreather with O2 saturation 87-90%. Updated APRN, orders for STAT chest x-ray.  0342: Chest x-ray received, updated APRN.   0343: Orders for left sided chest tube.

## 2024-09-01 NOTE — ASSESSMENT & PLAN NOTE
Acute nondisplaced fracture of the left transverse process of L2.  Non-operative management.   No bracing required.  Neurosurgery consultation not indicated.  Pain control.

## 2024-09-01 NOTE — PROGRESS NOTES
Hinged knee brace locked at 20 degrees flexion was applied to patient. Pt tolerates fit of brace well at this time.

## 2024-09-01 NOTE — PROCEDURES
DATE OF OPERATION:  9/1/2024    PREOPERATIVE DIAGNOSIS: Left pneumothorax.    POSTOPERATIVE DIAGNOSIS: Left pneumothorax.     PROCEDURE PERFORMED: Left tube thoracostomy.     SURGEON:    Linsey M Wegener, A.P.R.N.    ASSISTANT:    Miah Henriquez M.D.    ANESTHESIA:   local anesthesia and intravenous sedation.    INDICATIONS: The patient is a 49 year-old woman with a left pneumothorax. A left tube thoracostomy was performed.    FINDINGS: left pneumothorax.     SPECIMEN:     None.    ESTIMATED BLOOD LOSS:  Minimal    PROCEDURE: Following informed consent consent, the patient was properly identified and optimally positioned. An anesthetic consisting of local anesthesia and intravenous sedation was administered. The left chest wall was widely prepped with ChloraPrep® and draped into a sterile field.      Local anesthetic was used to infiltrate the chest tube site.   Multiple intercostal nerve blocks were placed above and below the chest tube site dermatome.  A transverse incision was made in the left 5th intercostal space, anterior axillary line and the subcutaneous tissue spread bluntly. The thoracic cavity was accessed bluntly over the rib and a 24 Fr chest tube placed in a posterior apical orientation and secured to the skin with a 0 Ethibond interrupted suture.     The chest tube was connected to closed suction drainage and a sterile dressing was applied. The patient tolerated the procedure well. There were no apparent complications.        ____________________________________     Linsey M Wegener, A.P.R.N.    DD: 9/1/2024  4:32 AM

## 2024-09-02 ENCOUNTER — APPOINTMENT (OUTPATIENT)
Dept: RADIOLOGY | Facility: MEDICAL CENTER | Age: 49
DRG: 982 | End: 2024-09-02
Attending: NURSE PRACTITIONER
Payer: COMMERCIAL

## 2024-09-02 LAB
ALBUMIN SERPL BCP-MCNC: 3.4 G/DL (ref 3.2–4.9)
ALBUMIN/GLOB SERPL: 1.2 G/DL
ALP SERPL-CCNC: 63 U/L (ref 30–99)
ALT SERPL-CCNC: 12 U/L (ref 2–50)
ANION GAP SERPL CALC-SCNC: 10 MMOL/L (ref 7–16)
AST SERPL-CCNC: 19 U/L (ref 12–45)
BASOPHILS # BLD AUTO: 0.3 % (ref 0–1.8)
BASOPHILS # BLD: 0.03 K/UL (ref 0–0.12)
BILIRUB SERPL-MCNC: 0.3 MG/DL (ref 0.1–1.5)
BUN SERPL-MCNC: 13 MG/DL (ref 8–22)
CALCIUM ALBUM COR SERPL-MCNC: 9 MG/DL (ref 8.5–10.5)
CALCIUM SERPL-MCNC: 8.5 MG/DL (ref 8.5–10.5)
CHLORIDE SERPL-SCNC: 102 MMOL/L (ref 96–112)
CO2 SERPL-SCNC: 24 MMOL/L (ref 20–33)
CREAT SERPL-MCNC: 0.67 MG/DL (ref 0.5–1.4)
EOSINOPHIL # BLD AUTO: 0.2 K/UL (ref 0–0.51)
EOSINOPHIL NFR BLD: 1.8 % (ref 0–6.9)
ERYTHROCYTE [DISTWIDTH] IN BLOOD BY AUTOMATED COUNT: 50.5 FL (ref 35.9–50)
GFR SERPLBLD CREATININE-BSD FMLA CKD-EPI: 105 ML/MIN/1.73 M 2
GLOBULIN SER CALC-MCNC: 2.8 G/DL (ref 1.9–3.5)
GLUCOSE SERPL-MCNC: 96 MG/DL (ref 65–99)
HCT VFR BLD AUTO: 36.6 % (ref 37–47)
HGB BLD-MCNC: 12 G/DL (ref 12–16)
IMM GRANULOCYTES # BLD AUTO: 0.04 K/UL (ref 0–0.11)
IMM GRANULOCYTES NFR BLD AUTO: 0.4 % (ref 0–0.9)
LYMPHOCYTES # BLD AUTO: 3.98 K/UL (ref 1–4.8)
LYMPHOCYTES NFR BLD: 35.2 % (ref 22–41)
MAGNESIUM SERPL-MCNC: 2.4 MG/DL (ref 1.5–2.5)
MCH RBC QN AUTO: 29.9 PG (ref 27–33)
MCHC RBC AUTO-ENTMCNC: 32.8 G/DL (ref 32.2–35.5)
MCV RBC AUTO: 91 FL (ref 81.4–97.8)
MONOCYTES # BLD AUTO: 0.59 K/UL (ref 0–0.85)
MONOCYTES NFR BLD AUTO: 5.2 % (ref 0–13.4)
NEUTROPHILS # BLD AUTO: 6.48 K/UL (ref 1.82–7.42)
NEUTROPHILS NFR BLD: 57.1 % (ref 44–72)
NRBC # BLD AUTO: 0 K/UL
NRBC BLD-RTO: 0 /100 WBC (ref 0–0.2)
PHOSPHATE SERPL-MCNC: 3.2 MG/DL (ref 2.5–4.5)
PLATELET # BLD AUTO: 230 K/UL (ref 164–446)
PMV BLD AUTO: 10.8 FL (ref 9–12.9)
POTASSIUM SERPL-SCNC: 4.2 MMOL/L (ref 3.6–5.5)
PROT SERPL-MCNC: 6.2 G/DL (ref 6–8.2)
RBC # BLD AUTO: 4.02 M/UL (ref 4.2–5.4)
SODIUM SERPL-SCNC: 136 MMOL/L (ref 135–145)
WBC # BLD AUTO: 11.3 K/UL (ref 4.8–10.8)

## 2024-09-02 PROCEDURE — A9270 NON-COVERED ITEM OR SERVICE: HCPCS | Performed by: NURSE PRACTITIONER

## 2024-09-02 PROCEDURE — 700102 HCHG RX REV CODE 250 W/ 637 OVERRIDE(OP): Performed by: NURSE PRACTITIONER

## 2024-09-02 PROCEDURE — 80053 COMPREHEN METABOLIC PANEL: CPT

## 2024-09-02 PROCEDURE — 97163 PT EVAL HIGH COMPLEX 45 MIN: CPT

## 2024-09-02 PROCEDURE — 94640 AIRWAY INHALATION TREATMENT: CPT

## 2024-09-02 PROCEDURE — 700102 HCHG RX REV CODE 250 W/ 637 OVERRIDE(OP): Performed by: REGISTERED NURSE

## 2024-09-02 PROCEDURE — 71045 X-RAY EXAM CHEST 1 VIEW: CPT

## 2024-09-02 PROCEDURE — 770022 HCHG ROOM/CARE - ICU (200)

## 2024-09-02 PROCEDURE — 84100 ASSAY OF PHOSPHORUS: CPT

## 2024-09-02 PROCEDURE — 97535 SELF CARE MNGMENT TRAINING: CPT

## 2024-09-02 PROCEDURE — 700111 HCHG RX REV CODE 636 W/ 250 OVERRIDE (IP): Mod: JZ | Performed by: SURGERY

## 2024-09-02 PROCEDURE — 85025 COMPLETE CBC W/AUTO DIFF WBC: CPT

## 2024-09-02 PROCEDURE — 94669 MECHANICAL CHEST WALL OSCILL: CPT

## 2024-09-02 PROCEDURE — 700101 HCHG RX REV CODE 250: Performed by: NURSE PRACTITIONER

## 2024-09-02 PROCEDURE — 99153 MOD SED SAME PHYS/QHP EA: CPT

## 2024-09-02 PROCEDURE — 99233 SBSQ HOSP IP/OBS HIGH 50: CPT | Performed by: SURGERY

## 2024-09-02 PROCEDURE — 94664 DEMO&/EVAL PT USE INHALER: CPT

## 2024-09-02 PROCEDURE — A9270 NON-COVERED ITEM OR SERVICE: HCPCS | Performed by: REGISTERED NURSE

## 2024-09-02 PROCEDURE — 83735 ASSAY OF MAGNESIUM: CPT

## 2024-09-02 PROCEDURE — 97166 OT EVAL MOD COMPLEX 45 MIN: CPT

## 2024-09-02 RX ORDER — IPRATROPIUM BROMIDE AND ALBUTEROL SULFATE 2.5; .5 MG/3ML; MG/3ML
3 SOLUTION RESPIRATORY (INHALATION)
Status: DISCONTINUED | OUTPATIENT
Start: 2024-09-02 | End: 2024-09-09 | Stop reason: HOSPADM

## 2024-09-02 RX ADMIN — OXYCODONE HYDROCHLORIDE 5 MG: 5 TABLET ORAL at 17:05

## 2024-09-02 RX ADMIN — OXYCODONE HYDROCHLORIDE 5 MG: 5 TABLET ORAL at 03:51

## 2024-09-02 RX ADMIN — ENOXAPARIN SODIUM 40 MG: 100 INJECTION SUBCUTANEOUS at 17:56

## 2024-09-02 RX ADMIN — DOCUSATE SODIUM 100 MG: 100 CAPSULE, LIQUID FILLED ORAL at 05:19

## 2024-09-02 RX ADMIN — GABAPENTIN 200 MG: 100 CAPSULE ORAL at 05:19

## 2024-09-02 RX ADMIN — GABAPENTIN 200 MG: 100 CAPSULE ORAL at 15:00

## 2024-09-02 RX ADMIN — GABAPENTIN 200 MG: 100 CAPSULE ORAL at 21:31

## 2024-09-02 RX ADMIN — CELECOXIB 200 MG: 100 CAPSULE ORAL at 05:19

## 2024-09-02 RX ADMIN — POLYETHYLENE GLYCOL 3350 1 PACKET: 17 POWDER, FOR SOLUTION ORAL at 18:00

## 2024-09-02 RX ADMIN — OXYCODONE HYDROCHLORIDE 5 MG: 5 TABLET ORAL at 07:50

## 2024-09-02 RX ADMIN — SENNOSIDES AND DOCUSATE SODIUM 1 TABLET: 50; 8.6 TABLET ORAL at 20:23

## 2024-09-02 RX ADMIN — ACETAMINOPHEN 1000 MG: 500 TABLET ORAL at 18:00

## 2024-09-02 RX ADMIN — METAXALONE 800 MG: 800 TABLET ORAL at 05:19

## 2024-09-02 RX ADMIN — MAGNESIUM HYDROXIDE 30 ML: 1200 LIQUID ORAL at 18:00

## 2024-09-02 RX ADMIN — LIDOCAINE 2 PATCH: 4 PATCH TOPICAL at 18:09

## 2024-09-02 RX ADMIN — ENOXAPARIN SODIUM 40 MG: 100 INJECTION SUBCUTANEOUS at 05:19

## 2024-09-02 RX ADMIN — METAXALONE 800 MG: 800 TABLET ORAL at 18:00

## 2024-09-02 RX ADMIN — ACETAMINOPHEN 1000 MG: 500 TABLET ORAL at 05:19

## 2024-09-02 RX ADMIN — OXYCODONE HYDROCHLORIDE 5 MG: 5 TABLET ORAL at 22:36

## 2024-09-02 RX ADMIN — ACETAMINOPHEN 1000 MG: 500 TABLET ORAL at 12:00

## 2024-09-02 RX ADMIN — NICOTINE TRANSDERMAL SYSTEM 21 MG: 21 PATCH, EXTENDED RELEASE TRANSDERMAL at 05:20

## 2024-09-02 RX ADMIN — DOCUSATE SODIUM 100 MG: 100 CAPSULE, LIQUID FILLED ORAL at 18:00

## 2024-09-02 RX ADMIN — OXYCODONE HYDROCHLORIDE 5 MG: 5 TABLET ORAL at 12:00

## 2024-09-02 RX ADMIN — METAXALONE 800 MG: 800 TABLET ORAL at 12:00

## 2024-09-02 ASSESSMENT — ENCOUNTER SYMPTOMS
NECK PAIN: 0
SHORTNESS OF BREATH: 0
NUMBNESS: 0
WEAKNESS: 0
CHEST TIGHTNESS: 0
BACK PAIN: 0
COUGH: 0
CONSTIPATION: 1
ABDOMINAL PAIN: 0
NAUSEA: 0
MYALGIAS: 1
HEADACHES: 0
STRIDOR: 0
DIZZINESS: 0

## 2024-09-02 ASSESSMENT — COGNITIVE AND FUNCTIONAL STATUS - GENERAL
SUGGESTED CMS G CODE MODIFIER DAILY ACTIVITY: CK
HELP NEEDED FOR BATHING: A LOT
WALKING IN HOSPITAL ROOM: TOTAL
DRESSING REGULAR UPPER BODY CLOTHING: A LITTLE
STANDING UP FROM CHAIR USING ARMS: A LOT
TOILETING: A LOT
TURNING FROM BACK TO SIDE WHILE IN FLAT BAD: A LITTLE
MOVING TO AND FROM BED TO CHAIR: A LOT
SUGGESTED CMS G CODE MODIFIER MOBILITY: CL
DRESSING REGULAR LOWER BODY CLOTHING: A LOT
MOBILITY SCORE: 12
CLIMB 3 TO 5 STEPS WITH RAILING: TOTAL
MOVING FROM LYING ON BACK TO SITTING ON SIDE OF FLAT BED: A LITTLE
DAILY ACTIVITIY SCORE: 17

## 2024-09-02 ASSESSMENT — GAIT ASSESSMENTS: GAIT LEVEL OF ASSIST: UNABLE TO PARTICIPATE

## 2024-09-02 ASSESSMENT — PAIN DESCRIPTION - PAIN TYPE
TYPE: ACUTE PAIN

## 2024-09-02 ASSESSMENT — ACTIVITIES OF DAILY LIVING (ADL): TOILETING: INDEPENDENT
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
## 2024-09-02 NOTE — CARE PLAN
Problem: Humidified High Flow Nasal Cannula  Goal: Maintain adequate oxygenation dependent on patient condition  Description: Target End Date:  resolve prior to discharge or when underlying condition is resolved/stabilized    1.  Implement humidified high flow oxygen therapy  2.  Titrate high flow oxygen to maintain appropriate SpO2  Outcome: Progressing         Respiratory Update  Therapy: HHFNC  Settings: 30L/45%    Pt tolerating current therapy well with no adverse reactions, will continue to monitor and wean as tolerated     Problem: Hyperinflation  Goal: Prevent or improve atelectasis  Description: Target End Date:  3 to 4 days    1. Instruct incentive spirometry usage  2.  Perform hyperinflation therapy as indicated  Outcome: Progressing        Respiratory Update  Treatment Modality: PEP  Frequency: QID  IS 1000    Pt tolerating current treatments well with no adverse reactions, will continue to monitor
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
## 2024-09-02 NOTE — CARE PLAN
The patient is Watcher - Medium risk of patient condition declining or worsening    Shift Goals  Clinical Goals: pain control, IS, mobility, pulmonary hygiene  Patient Goals: pain control, decrease O2 demands  Family Goals: updates    Progress made toward(s) clinical / shift goals:    Problem: Knowledge Deficit - Standard  Goal: Patient and family/care givers will demonstrate understanding of plan of care, disease process/condition, diagnostic tests and medications  Outcome: Progressing     Problem: Pain - Standard  Goal: Alleviation of pain or a reduction in pain to the patient’s comfort goal  Outcome: Progressing     Problem: Fall Risk  Goal: Patient will remain free from falls  Outcome: Progressing     Problem: Communication  Goal: The ability to communicate needs accurately and effectively will improve  Outcome: Progressing     Problem: Chest Tube Management  Goal: Complications related to chest tube will be avoided or minimized  Outcome: Progressing     Problem: Skin Integrity  Goal: Skin integrity is maintained or improved  Outcome: Progressing       Patient is not progressing towards the following goals:

## 2024-09-03 ENCOUNTER — APPOINTMENT (OUTPATIENT)
Dept: RADIOLOGY | Facility: MEDICAL CENTER | Age: 49
DRG: 982 | End: 2024-09-03
Attending: NURSE PRACTITIONER
Payer: COMMERCIAL

## 2024-09-03 LAB
ALBUMIN SERPL BCP-MCNC: 3.7 G/DL (ref 3.2–4.9)
ALBUMIN/GLOB SERPL: 1.3 G/DL
ALP SERPL-CCNC: 87 U/L (ref 30–99)
ALT SERPL-CCNC: 21 U/L (ref 2–50)
ANION GAP SERPL CALC-SCNC: 10 MMOL/L (ref 7–16)
AST SERPL-CCNC: 27 U/L (ref 12–45)
BASOPHILS # BLD AUTO: 0.3 % (ref 0–1.8)
BASOPHILS # BLD: 0.03 K/UL (ref 0–0.12)
BILIRUB SERPL-MCNC: 0.3 MG/DL (ref 0.1–1.5)
BUN SERPL-MCNC: 10 MG/DL (ref 8–22)
CALCIUM ALBUM COR SERPL-MCNC: 9.1 MG/DL (ref 8.5–10.5)
CALCIUM SERPL-MCNC: 8.9 MG/DL (ref 8.5–10.5)
CHLORIDE SERPL-SCNC: 101 MMOL/L (ref 96–112)
CO2 SERPL-SCNC: 28 MMOL/L (ref 20–33)
CREAT SERPL-MCNC: 0.74 MG/DL (ref 0.5–1.4)
EOSINOPHIL # BLD AUTO: 0.31 K/UL (ref 0–0.51)
EOSINOPHIL NFR BLD: 3.1 % (ref 0–6.9)
ERYTHROCYTE [DISTWIDTH] IN BLOOD BY AUTOMATED COUNT: 50.2 FL (ref 35.9–50)
GFR SERPLBLD CREATININE-BSD FMLA CKD-EPI: 98 ML/MIN/1.73 M 2
GLOBULIN SER CALC-MCNC: 2.9 G/DL (ref 1.9–3.5)
GLUCOSE SERPL-MCNC: 107 MG/DL (ref 65–99)
HCT VFR BLD AUTO: 40.2 % (ref 37–47)
HGB BLD-MCNC: 12.8 G/DL (ref 12–16)
IMM GRANULOCYTES # BLD AUTO: 0.04 K/UL (ref 0–0.11)
IMM GRANULOCYTES NFR BLD AUTO: 0.4 % (ref 0–0.9)
LYMPHOCYTES # BLD AUTO: 3.8 K/UL (ref 1–4.8)
LYMPHOCYTES NFR BLD: 37.9 % (ref 22–41)
MCH RBC QN AUTO: 28.9 PG (ref 27–33)
MCHC RBC AUTO-ENTMCNC: 31.8 G/DL (ref 32.2–35.5)
MCV RBC AUTO: 90.7 FL (ref 81.4–97.8)
MONOCYTES # BLD AUTO: 0.49 K/UL (ref 0–0.85)
MONOCYTES NFR BLD AUTO: 4.9 % (ref 0–13.4)
NEUTROPHILS # BLD AUTO: 5.35 K/UL (ref 1.82–7.42)
NEUTROPHILS NFR BLD: 53.4 % (ref 44–72)
NRBC # BLD AUTO: 0 K/UL
NRBC BLD-RTO: 0 /100 WBC (ref 0–0.2)
PLATELET # BLD AUTO: 242 K/UL (ref 164–446)
PMV BLD AUTO: 10.6 FL (ref 9–12.9)
POTASSIUM SERPL-SCNC: 4.2 MMOL/L (ref 3.6–5.5)
PROT SERPL-MCNC: 6.6 G/DL (ref 6–8.2)
RBC # BLD AUTO: 4.43 M/UL (ref 4.2–5.4)
SODIUM SERPL-SCNC: 139 MMOL/L (ref 135–145)
WBC # BLD AUTO: 10 K/UL (ref 4.8–10.8)

## 2024-09-03 PROCEDURE — 700111 HCHG RX REV CODE 636 W/ 250 OVERRIDE (IP): Mod: JZ | Performed by: SURGERY

## 2024-09-03 PROCEDURE — 700102 HCHG RX REV CODE 250 W/ 637 OVERRIDE(OP): Performed by: REGISTERED NURSE

## 2024-09-03 PROCEDURE — 99233 SBSQ HOSP IP/OBS HIGH 50: CPT | Performed by: SURGERY

## 2024-09-03 PROCEDURE — 80053 COMPREHEN METABOLIC PANEL: CPT

## 2024-09-03 PROCEDURE — 700101 HCHG RX REV CODE 250: Performed by: NURSE PRACTITIONER

## 2024-09-03 PROCEDURE — 71045 X-RAY EXAM CHEST 1 VIEW: CPT

## 2024-09-03 PROCEDURE — 700102 HCHG RX REV CODE 250 W/ 637 OVERRIDE(OP): Performed by: NURSE PRACTITIONER

## 2024-09-03 PROCEDURE — 94669 MECHANICAL CHEST WALL OSCILL: CPT

## 2024-09-03 PROCEDURE — 770022 HCHG ROOM/CARE - ICU (200)

## 2024-09-03 PROCEDURE — 85025 COMPLETE CBC W/AUTO DIFF WBC: CPT

## 2024-09-03 PROCEDURE — A9270 NON-COVERED ITEM OR SERVICE: HCPCS | Performed by: REGISTERED NURSE

## 2024-09-03 PROCEDURE — A9270 NON-COVERED ITEM OR SERVICE: HCPCS | Performed by: NURSE PRACTITIONER

## 2024-09-03 PROCEDURE — A9270 NON-COVERED ITEM OR SERVICE: HCPCS | Performed by: SURGERY

## 2024-09-03 PROCEDURE — 700102 HCHG RX REV CODE 250 W/ 637 OVERRIDE(OP): Performed by: SURGERY

## 2024-09-03 PROCEDURE — 94640 AIRWAY INHALATION TREATMENT: CPT

## 2024-09-03 RX ORDER — GABAPENTIN 300 MG/1
300 CAPSULE ORAL EVERY 8 HOURS
Status: DISCONTINUED | OUTPATIENT
Start: 2024-09-03 | End: 2024-09-04

## 2024-09-03 RX ADMIN — ACETAMINOPHEN 1000 MG: 500 TABLET ORAL at 05:20

## 2024-09-03 RX ADMIN — ACETAMINOPHEN 1000 MG: 500 TABLET ORAL at 11:10

## 2024-09-03 RX ADMIN — GABAPENTIN 200 MG: 100 CAPSULE ORAL at 14:00

## 2024-09-03 RX ADMIN — ACETAMINOPHEN 1000 MG: 500 TABLET ORAL at 23:28

## 2024-09-03 RX ADMIN — ENOXAPARIN SODIUM 40 MG: 100 INJECTION SUBCUTANEOUS at 18:03

## 2024-09-03 RX ADMIN — OXYCODONE HYDROCHLORIDE 10 MG: 10 TABLET ORAL at 14:00

## 2024-09-03 RX ADMIN — ACETAMINOPHEN 1000 MG: 500 TABLET ORAL at 18:00

## 2024-09-03 RX ADMIN — METAXALONE 800 MG: 800 TABLET ORAL at 11:10

## 2024-09-03 RX ADMIN — OXYCODONE HYDROCHLORIDE 10 MG: 10 TABLET ORAL at 20:46

## 2024-09-03 RX ADMIN — LIDOCAINE 2 PATCH: 4 PATCH TOPICAL at 18:14

## 2024-09-03 RX ADMIN — GABAPENTIN 300 MG: 300 CAPSULE ORAL at 21:10

## 2024-09-03 RX ADMIN — DOCUSATE SODIUM 100 MG: 100 CAPSULE, LIQUID FILLED ORAL at 05:21

## 2024-09-03 RX ADMIN — POLYETHYLENE GLYCOL 3350 1 PACKET: 17 POWDER, FOR SOLUTION ORAL at 05:21

## 2024-09-03 RX ADMIN — SENNOSIDES AND DOCUSATE SODIUM 1 TABLET: 50; 8.6 TABLET ORAL at 20:45

## 2024-09-03 RX ADMIN — OXYCODONE HYDROCHLORIDE 10 MG: 10 TABLET ORAL at 23:28

## 2024-09-03 RX ADMIN — ENOXAPARIN SODIUM 40 MG: 100 INJECTION SUBCUTANEOUS at 05:21

## 2024-09-03 RX ADMIN — MAGNESIUM HYDROXIDE 30 ML: 1200 LIQUID ORAL at 18:03

## 2024-09-03 RX ADMIN — NICOTINE TRANSDERMAL SYSTEM 21 MG: 21 PATCH, EXTENDED RELEASE TRANSDERMAL at 05:20

## 2024-09-03 RX ADMIN — OXYCODONE HYDROCHLORIDE 10 MG: 10 TABLET ORAL at 18:00

## 2024-09-03 RX ADMIN — METAXALONE 800 MG: 800 TABLET ORAL at 05:21

## 2024-09-03 RX ADMIN — OXYCODONE HYDROCHLORIDE 10 MG: 10 TABLET ORAL at 11:10

## 2024-09-03 RX ADMIN — DOCUSATE SODIUM 100 MG: 100 CAPSULE, LIQUID FILLED ORAL at 18:03

## 2024-09-03 RX ADMIN — GABAPENTIN 200 MG: 100 CAPSULE ORAL at 05:20

## 2024-09-03 RX ADMIN — CELECOXIB 200 MG: 100 CAPSULE ORAL at 05:22

## 2024-09-03 RX ADMIN — METAXALONE 800 MG: 800 TABLET ORAL at 18:04

## 2024-09-03 RX ADMIN — POLYETHYLENE GLYCOL 3350 1 PACKET: 17 POWDER, FOR SOLUTION ORAL at 18:03

## 2024-09-03 RX ADMIN — OXYCODONE HYDROCHLORIDE 10 MG: 10 TABLET ORAL at 07:50

## 2024-09-03 RX ADMIN — ACETAMINOPHEN 1000 MG: 500 TABLET ORAL at 00:14

## 2024-09-03 RX ADMIN — OXYCODONE HYDROCHLORIDE 10 MG: 10 TABLET ORAL at 04:14

## 2024-09-03 ASSESSMENT — FIBROSIS 4 INDEX: FIB4 SCORE: 1.24

## 2024-09-03 NOTE — DISCHARGE PLANNING
note:  Received a call from Starr THOMAS at [facility]. She said that pt has [employer] [facility] insurance so pt would only be assigned a  if pt has discharge needs and when she is stable for discharge please call [facility] services at [phone] to talk to the assigned CM about discharge needs.
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
## 2024-09-03 NOTE — CARE PLAN
Problem: Humidified High Flow Nasal Cannula  Goal: Maintain adequate oxygenation dependent on patient condition  Description: Target End Date:  resolve prior to discharge or when underlying condition is resolved/stabilized    1.  Implement humidified high flow oxygen therapy  2.  Titrate high flow oxygen to maintain appropriate SpO2  Outcome: Progressing     Problem: Hyperinflation  Goal: Prevent or improve atelectasis  Description: Target End Date:  3 to 4 days    1. Instruct incentive spirometry usage  2.  Perform hyperinflation therapy as indicated  Outcome: Progressing   Pep qid

## 2024-09-03 NOTE — CARE PLAN
The patient is Watcher - Medium risk of patient condition declining or worsening    Shift Goals  Clinical Goals: pulmonary hygiene, mobility, pain control  Patient Goals: pain control  Family Goals: updates    Progress made toward(s) clinical / shift goals:      Problem: Knowledge Deficit - Standard  Goal: Patient and family/care givers will demonstrate understanding of plan of care, disease process/condition, diagnostic tests and medications  Outcome: Progressing     Problem: Pain - Standard  Goal: Alleviation of pain or a reduction in pain to the patient’s comfort goal  Outcome: Progressing     Problem: Fall Risk  Goal: Patient will remain free from falls  Outcome: Progressing     Problem: Chest Tube Management  Goal: Complications related to chest tube will be avoided or minimized  Outcome: Progressing     Problem: Skin Integrity  Goal: Skin integrity is maintained or improved  Outcome: Progressing       Patient is not progressing towards the following goals:

## 2024-09-04 ENCOUNTER — APPOINTMENT (OUTPATIENT)
Dept: RADIOLOGY | Facility: MEDICAL CENTER | Age: 49
DRG: 982 | End: 2024-09-04
Attending: NURSE PRACTITIONER
Payer: COMMERCIAL

## 2024-09-04 LAB
ALBUMIN SERPL BCP-MCNC: 3.1 G/DL (ref 3.2–4.9)
ALBUMIN/GLOB SERPL: 1.2 G/DL
ALP SERPL-CCNC: 63 U/L (ref 30–99)
ALT SERPL-CCNC: 15 U/L (ref 2–50)
ANION GAP SERPL CALC-SCNC: 9 MMOL/L (ref 7–16)
AST SERPL-CCNC: 14 U/L (ref 12–45)
BASOPHILS # BLD AUTO: 0.3 % (ref 0–1.8)
BASOPHILS # BLD: 0.03 K/UL (ref 0–0.12)
BILIRUB SERPL-MCNC: 0.3 MG/DL (ref 0.1–1.5)
BUN SERPL-MCNC: 9 MG/DL (ref 8–22)
CALCIUM ALBUM COR SERPL-MCNC: 9.1 MG/DL (ref 8.5–10.5)
CALCIUM SERPL-MCNC: 8.4 MG/DL (ref 8.5–10.5)
CHLORIDE SERPL-SCNC: 100 MMOL/L (ref 96–112)
CO2 SERPL-SCNC: 27 MMOL/L (ref 20–33)
CREAT SERPL-MCNC: 0.63 MG/DL (ref 0.5–1.4)
EOSINOPHIL # BLD AUTO: 0.51 K/UL (ref 0–0.51)
EOSINOPHIL NFR BLD: 5.7 % (ref 0–6.9)
ERYTHROCYTE [DISTWIDTH] IN BLOOD BY AUTOMATED COUNT: 49.4 FL (ref 35.9–50)
GFR SERPLBLD CREATININE-BSD FMLA CKD-EPI: 107 ML/MIN/1.73 M 2
GLOBULIN SER CALC-MCNC: 2.5 G/DL (ref 1.9–3.5)
GLUCOSE SERPL-MCNC: 87 MG/DL (ref 65–99)
HCT VFR BLD AUTO: 34.6 % (ref 37–47)
HGB BLD-MCNC: 11.4 G/DL (ref 12–16)
IMM GRANULOCYTES # BLD AUTO: 0.02 K/UL (ref 0–0.11)
IMM GRANULOCYTES NFR BLD AUTO: 0.2 % (ref 0–0.9)
LYMPHOCYTES # BLD AUTO: 3.83 K/UL (ref 1–4.8)
LYMPHOCYTES NFR BLD: 43.1 % (ref 22–41)
MCH RBC QN AUTO: 29.8 PG (ref 27–33)
MCHC RBC AUTO-ENTMCNC: 32.9 G/DL (ref 32.2–35.5)
MCV RBC AUTO: 90.6 FL (ref 81.4–97.8)
MONOCYTES # BLD AUTO: 0.58 K/UL (ref 0–0.85)
MONOCYTES NFR BLD AUTO: 6.5 % (ref 0–13.4)
NEUTROPHILS # BLD AUTO: 3.92 K/UL (ref 1.82–7.42)
NEUTROPHILS NFR BLD: 44.2 % (ref 44–72)
NRBC # BLD AUTO: 0 K/UL
NRBC BLD-RTO: 0 /100 WBC (ref 0–0.2)
PLATELET # BLD AUTO: 215 K/UL (ref 164–446)
PMV BLD AUTO: 10.9 FL (ref 9–12.9)
POTASSIUM SERPL-SCNC: 3.7 MMOL/L (ref 3.6–5.5)
PROT SERPL-MCNC: 5.6 G/DL (ref 6–8.2)
RBC # BLD AUTO: 3.82 M/UL (ref 4.2–5.4)
SODIUM SERPL-SCNC: 136 MMOL/L (ref 135–145)
WBC # BLD AUTO: 8.9 K/UL (ref 4.8–10.8)

## 2024-09-04 PROCEDURE — A9270 NON-COVERED ITEM OR SERVICE: HCPCS | Performed by: SURGERY

## 2024-09-04 PROCEDURE — 85025 COMPLETE CBC W/AUTO DIFF WBC: CPT

## 2024-09-04 PROCEDURE — 700102 HCHG RX REV CODE 250 W/ 637 OVERRIDE(OP): Performed by: SURGERY

## 2024-09-04 PROCEDURE — 700102 HCHG RX REV CODE 250 W/ 637 OVERRIDE(OP): Performed by: REGISTERED NURSE

## 2024-09-04 PROCEDURE — 94669 MECHANICAL CHEST WALL OSCILL: CPT

## 2024-09-04 PROCEDURE — A9270 NON-COVERED ITEM OR SERVICE: HCPCS | Performed by: REGISTERED NURSE

## 2024-09-04 PROCEDURE — 94640 AIRWAY INHALATION TREATMENT: CPT

## 2024-09-04 PROCEDURE — 99233 SBSQ HOSP IP/OBS HIGH 50: CPT | Performed by: SURGERY

## 2024-09-04 PROCEDURE — 302043 TAPE, HYPAFIX: Performed by: SURGERY

## 2024-09-04 PROCEDURE — A9270 NON-COVERED ITEM OR SERVICE: HCPCS | Performed by: NURSE PRACTITIONER

## 2024-09-04 PROCEDURE — 80053 COMPREHEN METABOLIC PANEL: CPT

## 2024-09-04 PROCEDURE — 770022 HCHG ROOM/CARE - ICU (200)

## 2024-09-04 PROCEDURE — 700102 HCHG RX REV CODE 250 W/ 637 OVERRIDE(OP): Performed by: NURSE PRACTITIONER

## 2024-09-04 PROCEDURE — 700111 HCHG RX REV CODE 636 W/ 250 OVERRIDE (IP): Mod: JZ | Performed by: SURGERY

## 2024-09-04 PROCEDURE — 700101 HCHG RX REV CODE 250: Performed by: NURSE PRACTITIONER

## 2024-09-04 PROCEDURE — 71045 X-RAY EXAM CHEST 1 VIEW: CPT

## 2024-09-04 RX ORDER — POTASSIUM CHLORIDE 1500 MG/1
40 TABLET, EXTENDED RELEASE ORAL ONCE
Status: COMPLETED | OUTPATIENT
Start: 2024-09-04 | End: 2024-09-04

## 2024-09-04 RX ORDER — GABAPENTIN 400 MG/1
400 CAPSULE ORAL EVERY 8 HOURS
Status: DISCONTINUED | OUTPATIENT
Start: 2024-09-04 | End: 2024-09-09 | Stop reason: HOSPADM

## 2024-09-04 RX ADMIN — ACETAMINOPHEN 1000 MG: 500 TABLET ORAL at 17:52

## 2024-09-04 RX ADMIN — ENOXAPARIN SODIUM 40 MG: 100 INJECTION SUBCUTANEOUS at 17:51

## 2024-09-04 RX ADMIN — GABAPENTIN 300 MG: 300 CAPSULE ORAL at 05:19

## 2024-09-04 RX ADMIN — METAXALONE 800 MG: 800 TABLET ORAL at 05:19

## 2024-09-04 RX ADMIN — NICOTINE TRANSDERMAL SYSTEM 21 MG: 21 PATCH, EXTENDED RELEASE TRANSDERMAL at 05:20

## 2024-09-04 RX ADMIN — MAGNESIUM HYDROXIDE 30 ML: 1200 LIQUID ORAL at 17:52

## 2024-09-04 RX ADMIN — OXYCODONE HYDROCHLORIDE 10 MG: 10 TABLET ORAL at 19:21

## 2024-09-04 RX ADMIN — POLYETHYLENE GLYCOL 3350 1 PACKET: 17 POWDER, FOR SOLUTION ORAL at 17:52

## 2024-09-04 RX ADMIN — LIDOCAINE 1 PATCH: 4 PATCH TOPICAL at 18:22

## 2024-09-04 RX ADMIN — POTASSIUM CHLORIDE 40 MEQ: 1500 TABLET, EXTENDED RELEASE ORAL at 14:35

## 2024-09-04 RX ADMIN — METAXALONE 800 MG: 800 TABLET ORAL at 17:52

## 2024-09-04 RX ADMIN — ACETAMINOPHEN 1000 MG: 500 TABLET ORAL at 11:19

## 2024-09-04 RX ADMIN — OXYCODONE HYDROCHLORIDE 10 MG: 10 TABLET ORAL at 23:07

## 2024-09-04 RX ADMIN — SENNOSIDES AND DOCUSATE SODIUM 1 TABLET: 50; 8.6 TABLET ORAL at 21:04

## 2024-09-04 RX ADMIN — ACETAMINOPHEN 1000 MG: 500 TABLET ORAL at 05:18

## 2024-09-04 RX ADMIN — OXYCODONE HYDROCHLORIDE 5 MG: 5 TABLET ORAL at 14:31

## 2024-09-04 RX ADMIN — DOCUSATE SODIUM 100 MG: 100 CAPSULE, LIQUID FILLED ORAL at 17:52

## 2024-09-04 RX ADMIN — GABAPENTIN 300 MG: 300 CAPSULE ORAL at 14:30

## 2024-09-04 RX ADMIN — ACETAMINOPHEN 1000 MG: 500 TABLET ORAL at 23:07

## 2024-09-04 RX ADMIN — OXYCODONE HYDROCHLORIDE 10 MG: 10 TABLET ORAL at 02:19

## 2024-09-04 RX ADMIN — OXYCODONE HYDROCHLORIDE 10 MG: 10 TABLET ORAL at 05:19

## 2024-09-04 RX ADMIN — POLYETHYLENE GLYCOL 3350 1 PACKET: 17 POWDER, FOR SOLUTION ORAL at 05:20

## 2024-09-04 RX ADMIN — OXYCODONE HYDROCHLORIDE 10 MG: 10 TABLET ORAL at 08:59

## 2024-09-04 RX ADMIN — ENOXAPARIN SODIUM 40 MG: 100 INJECTION SUBCUTANEOUS at 05:19

## 2024-09-04 RX ADMIN — CELECOXIB 200 MG: 100 CAPSULE ORAL at 05:18

## 2024-09-04 RX ADMIN — DOCUSATE SODIUM 100 MG: 100 CAPSULE, LIQUID FILLED ORAL at 05:18

## 2024-09-04 RX ADMIN — METAXALONE 800 MG: 800 TABLET ORAL at 11:22

## 2024-09-04 RX ADMIN — GABAPENTIN 400 MG: 400 CAPSULE ORAL at 21:04

## 2024-09-04 ASSESSMENT — FIBROSIS 4 INDEX: FIB4 SCORE: 0.86

## 2024-09-04 NOTE — CARE PLAN
Problem: Knowledge Deficit - Standard  Goal: Patient and family/care givers will demonstrate understanding of plan of care, disease process/condition, diagnostic tests and medications  Outcome: Progressing     Problem: Pain - Standard  Goal: Alleviation of pain or a reduction in pain to the patient’s comfort goal  Outcome: Progressing     Problem: Chest Tube Management  Goal: Complications related to chest tube will be avoided or minimized  Outcome: Progressing   The patient is Watcher - Medium risk of patient condition declining or worsening    Shift Goals  Clinical Goals: mobilize, pulmonary toiliting, pain control  Patient Goals: pain control, breathe better  Family Goals: assist in ADLS    Progress made toward(s) clinical / shift goals:  met    Patient is not progressing towards the following goals:
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
## 2024-09-04 NOTE — CARE PLAN
The patient is Watcher - Medium risk of patient condition declining or worsening    Shift Goals  Clinical Goals: Pain control, pulmonary hygiene, wean oxygen as tolerated/able, mobility, bowel movement  Patient Goals: Pain control, have bowel movement, sleep/rest  Family Goals: MARYAM-no family present at this time    Progress made toward(s) clinical / shift goals:  Problem: Pain - Standard  Goal: Alleviation of pain or a reduction in pain to the patient’s comfort goal  Outcome: Progressing  Problem: Fall Risk  Goal: Patient will remain free from falls  Outcome: Progressing  Problem: Chest Tube Management  Goal: Complications related to chest tube will be avoided or minimized  Outcome: Progressing  Problem: Respiratory  Goal: Patient will achieve/maintain optimum respiratory ventilation and gas exchange  Outcome: Progressing    Patient is not progressing towards the following goals:  Problem: Bowel Elimination  Goal: Establish and maintain regular bowel function  Outcome: Not Progressing    Patient has met the following goals:  Problem: Communication  Goal: The ability to communicate needs accurately and effectively will improve  Outcome: Met

## 2024-09-04 NOTE — PROGRESS NOTES
Surgical plan discussed with patient -R/a/bi reviewed she wishes to proceed with ORIF  To OR Thursday with Dr Forbes for R plateau    Npo after midnight tomorrow

## 2024-09-05 ENCOUNTER — APPOINTMENT (OUTPATIENT)
Dept: RADIOLOGY | Facility: MEDICAL CENTER | Age: 49
DRG: 982 | End: 2024-09-05
Attending: NURSE PRACTITIONER
Payer: COMMERCIAL

## 2024-09-05 ENCOUNTER — ANESTHESIA EVENT (OUTPATIENT)
Dept: SURGERY | Facility: MEDICAL CENTER | Age: 49
DRG: 982 | End: 2024-09-05
Payer: COMMERCIAL

## 2024-09-05 ENCOUNTER — ANESTHESIA (OUTPATIENT)
Dept: SURGERY | Facility: MEDICAL CENTER | Age: 49
DRG: 982 | End: 2024-09-05
Payer: COMMERCIAL

## 2024-09-05 ENCOUNTER — APPOINTMENT (OUTPATIENT)
Dept: RADIOLOGY | Facility: MEDICAL CENTER | Age: 49
DRG: 982 | End: 2024-09-05
Attending: ORTHOPAEDIC SURGERY
Payer: COMMERCIAL

## 2024-09-05 PROBLEM — Z75.8 DISCHARGE PLANNING ISSUES: Status: ACTIVE | Noted: 2024-09-05

## 2024-09-05 PROBLEM — F17.200 NICOTINE DEPENDENCE: Status: ACTIVE | Noted: 2024-09-05

## 2024-09-05 LAB
ALBUMIN SERPL BCP-MCNC: 3.3 G/DL (ref 3.2–4.9)
ALBUMIN/GLOB SERPL: 1.3 G/DL
ALP SERPL-CCNC: 76 U/L (ref 30–99)
ALT SERPL-CCNC: 24 U/L (ref 2–50)
ANION GAP SERPL CALC-SCNC: 10 MMOL/L (ref 7–16)
AST SERPL-CCNC: 34 U/L (ref 12–45)
BASOPHILS # BLD AUTO: 0.6 % (ref 0–1.8)
BASOPHILS # BLD: 0.05 K/UL (ref 0–0.12)
BILIRUB SERPL-MCNC: 0.3 MG/DL (ref 0.1–1.5)
BUN SERPL-MCNC: 10 MG/DL (ref 8–22)
CALCIUM ALBUM COR SERPL-MCNC: 9.1 MG/DL (ref 8.5–10.5)
CALCIUM SERPL-MCNC: 8.5 MG/DL (ref 8.5–10.5)
CHLORIDE SERPL-SCNC: 100 MMOL/L (ref 96–112)
CO2 SERPL-SCNC: 28 MMOL/L (ref 20–33)
CREAT SERPL-MCNC: 0.67 MG/DL (ref 0.5–1.4)
EOSINOPHIL # BLD AUTO: 0.61 K/UL (ref 0–0.51)
EOSINOPHIL NFR BLD: 6.8 % (ref 0–6.9)
ERYTHROCYTE [DISTWIDTH] IN BLOOD BY AUTOMATED COUNT: 49.1 FL (ref 35.9–50)
GFR SERPLBLD CREATININE-BSD FMLA CKD-EPI: 105 ML/MIN/1.73 M 2
GLOBULIN SER CALC-MCNC: 2.5 G/DL (ref 1.9–3.5)
GLUCOSE SERPL-MCNC: 91 MG/DL (ref 65–99)
HCT VFR BLD AUTO: 34.8 % (ref 37–47)
HGB BLD-MCNC: 11.4 G/DL (ref 12–16)
IMM GRANULOCYTES # BLD AUTO: 0.03 K/UL (ref 0–0.11)
IMM GRANULOCYTES NFR BLD AUTO: 0.3 % (ref 0–0.9)
LYMPHOCYTES # BLD AUTO: 3.69 K/UL (ref 1–4.8)
LYMPHOCYTES NFR BLD: 41 % (ref 22–41)
MAGNESIUM SERPL-MCNC: 2.3 MG/DL (ref 1.5–2.5)
MCH RBC QN AUTO: 29.8 PG (ref 27–33)
MCHC RBC AUTO-ENTMCNC: 32.8 G/DL (ref 32.2–35.5)
MCV RBC AUTO: 91.1 FL (ref 81.4–97.8)
MONOCYTES # BLD AUTO: 0.67 K/UL (ref 0–0.85)
MONOCYTES NFR BLD AUTO: 7.4 % (ref 0–13.4)
NEUTROPHILS # BLD AUTO: 3.96 K/UL (ref 1.82–7.42)
NEUTROPHILS NFR BLD: 43.9 % (ref 44–72)
NRBC # BLD AUTO: 0 K/UL
NRBC BLD-RTO: 0 /100 WBC (ref 0–0.2)
PHOSPHATE SERPL-MCNC: 4.4 MG/DL (ref 2.5–4.5)
PLATELET # BLD AUTO: 238 K/UL (ref 164–446)
PMV BLD AUTO: 10.6 FL (ref 9–12.9)
POTASSIUM SERPL-SCNC: 4.3 MMOL/L (ref 3.6–5.5)
PROT SERPL-MCNC: 5.8 G/DL (ref 6–8.2)
RBC # BLD AUTO: 3.82 M/UL (ref 4.2–5.4)
SODIUM SERPL-SCNC: 138 MMOL/L (ref 135–145)
WBC # BLD AUTO: 9 K/UL (ref 4.8–10.8)

## 2024-09-05 PROCEDURE — 700101 HCHG RX REV CODE 250: Performed by: STUDENT IN AN ORGANIZED HEALTH CARE EDUCATION/TRAINING PROGRAM

## 2024-09-05 PROCEDURE — 160036 HCHG PACU - EA ADDL 30 MINS PHASE I: Performed by: ORTHOPAEDIC SURGERY

## 2024-09-05 PROCEDURE — 99233 SBSQ HOSP IP/OBS HIGH 50: CPT

## 2024-09-05 PROCEDURE — 770001 HCHG ROOM/CARE - MED/SURG/GYN PRIV*

## 2024-09-05 PROCEDURE — A9270 NON-COVERED ITEM OR SERVICE: HCPCS | Performed by: STUDENT IN AN ORGANIZED HEALTH CARE EDUCATION/TRAINING PROGRAM

## 2024-09-05 PROCEDURE — A9270 NON-COVERED ITEM OR SERVICE: HCPCS | Performed by: REGISTERED NURSE

## 2024-09-05 PROCEDURE — 700102 HCHG RX REV CODE 250 W/ 637 OVERRIDE(OP): Performed by: STUDENT IN AN ORGANIZED HEALTH CARE EDUCATION/TRAINING PROGRAM

## 2024-09-05 PROCEDURE — 73560 X-RAY EXAM OF KNEE 1 OR 2: CPT | Mod: RT

## 2024-09-05 PROCEDURE — 160035 HCHG PACU - 1ST 60 MINS PHASE I: Performed by: ORTHOPAEDIC SURGERY

## 2024-09-05 PROCEDURE — 700111 HCHG RX REV CODE 636 W/ 250 OVERRIDE (IP): Performed by: STUDENT IN AN ORGANIZED HEALTH CARE EDUCATION/TRAINING PROGRAM

## 2024-09-05 PROCEDURE — 700101 HCHG RX REV CODE 250: Performed by: NURSE PRACTITIONER

## 2024-09-05 PROCEDURE — 84100 ASSAY OF PHOSPHORUS: CPT

## 2024-09-05 PROCEDURE — 160041 HCHG SURGERY MINUTES - EA ADDL 1 MIN LEVEL 4: Performed by: ORTHOPAEDIC SURGERY

## 2024-09-05 PROCEDURE — 700101 HCHG RX REV CODE 250: Performed by: ORTHOPAEDIC SURGERY

## 2024-09-05 PROCEDURE — 94669 MECHANICAL CHEST WALL OSCILL: CPT

## 2024-09-05 PROCEDURE — 160048 HCHG OR STATISTICAL LEVEL 1-5: Performed by: ORTHOPAEDIC SURGERY

## 2024-09-05 PROCEDURE — A9270 NON-COVERED ITEM OR SERVICE: HCPCS | Performed by: NURSE PRACTITIONER

## 2024-09-05 PROCEDURE — 700111 HCHG RX REV CODE 636 W/ 250 OVERRIDE (IP): Performed by: ORTHOPAEDIC SURGERY

## 2024-09-05 PROCEDURE — 71045 X-RAY EXAM CHEST 1 VIEW: CPT

## 2024-09-05 PROCEDURE — C1713 ANCHOR/SCREW BN/BN,TIS/BN: HCPCS | Performed by: ORTHOPAEDIC SURGERY

## 2024-09-05 PROCEDURE — 0QSG04Z REPOSITION RIGHT TIBIA WITH INTERNAL FIXATION DEVICE, OPEN APPROACH: ICD-10-PCS | Performed by: ORTHOPAEDIC SURGERY

## 2024-09-05 PROCEDURE — 700111 HCHG RX REV CODE 636 W/ 250 OVERRIDE (IP): Mod: JZ | Performed by: SURGERY

## 2024-09-05 PROCEDURE — 85025 COMPLETE CBC W/AUTO DIFF WBC: CPT

## 2024-09-05 PROCEDURE — 700105 HCHG RX REV CODE 258: Performed by: ORTHOPAEDIC SURGERY

## 2024-09-05 PROCEDURE — 160002 HCHG RECOVERY MINUTES (STAT): Performed by: ORTHOPAEDIC SURGERY

## 2024-09-05 PROCEDURE — 700102 HCHG RX REV CODE 250 W/ 637 OVERRIDE(OP): Performed by: NURSE PRACTITIONER

## 2024-09-05 PROCEDURE — 80053 COMPREHEN METABOLIC PANEL: CPT

## 2024-09-05 PROCEDURE — 700105 HCHG RX REV CODE 258: Performed by: STUDENT IN AN ORGANIZED HEALTH CARE EDUCATION/TRAINING PROGRAM

## 2024-09-05 PROCEDURE — A9270 NON-COVERED ITEM OR SERVICE: HCPCS | Performed by: SURGERY

## 2024-09-05 PROCEDURE — 83735 ASSAY OF MAGNESIUM: CPT

## 2024-09-05 PROCEDURE — 160029 HCHG SURGERY MINUTES - 1ST 30 MINS LEVEL 4: Performed by: ORTHOPAEDIC SURGERY

## 2024-09-05 PROCEDURE — 502000 HCHG MISC OR IMPLANTS RC 0278: Performed by: ORTHOPAEDIC SURGERY

## 2024-09-05 PROCEDURE — 700102 HCHG RX REV CODE 250 W/ 637 OVERRIDE(OP): Performed by: REGISTERED NURSE

## 2024-09-05 PROCEDURE — 700102 HCHG RX REV CODE 250 W/ 637 OVERRIDE(OP): Performed by: SURGERY

## 2024-09-05 PROCEDURE — 160009 HCHG ANES TIME/MIN: Performed by: ORTHOPAEDIC SURGERY

## 2024-09-05 DEVICE — WASHER FOR 4.0MM SCREW 8.0MM: Type: IMPLANTABLE DEVICE | Site: TIBIA | Status: FUNCTIONAL

## 2024-09-05 DEVICE — GRAFT BONE CANCELLOUS FREEZE DRIED CHIPS ALLOSOURCE 15CC (1EA): Type: IMPLANTABLE DEVICE | Site: TIBIA | Status: FUNCTIONAL

## 2024-09-05 DEVICE — IMPLANTABLE DEVICE: Type: IMPLANTABLE DEVICE | Site: TIBIA | Status: FUNCTIONAL

## 2024-09-05 DEVICE — SCREW BONE L36MM DIA3.5MM STANDARD CORTICAL TITANIUM SELF TAPPING LOCKING LOW PROFILE AXSOS 3: Type: IMPLANTABLE DEVICE | Site: TIBIA | Status: FUNCTIONAL

## 2024-09-05 DEVICE — 3.5MM CORTEX TI SCREW 3.5MM L40MM: Type: IMPLANTABLE DEVICE | Site: TIBIA | Status: FUNCTIONAL

## 2024-09-05 RX ORDER — OXYCODONE HCL 5 MG/5 ML
5 SOLUTION, ORAL ORAL
Status: COMPLETED | OUTPATIENT
Start: 2024-09-05 | End: 2024-09-05

## 2024-09-05 RX ORDER — IPRATROPIUM BROMIDE AND ALBUTEROL SULFATE 2.5; .5 MG/3ML; MG/3ML
3 SOLUTION RESPIRATORY (INHALATION)
Status: DISCONTINUED | OUTPATIENT
Start: 2024-09-05 | End: 2024-09-05 | Stop reason: HOSPADM

## 2024-09-05 RX ORDER — HYDROMORPHONE HYDROCHLORIDE 1 MG/ML
0.4 INJECTION, SOLUTION INTRAMUSCULAR; INTRAVENOUS; SUBCUTANEOUS
Status: DISCONTINUED | OUTPATIENT
Start: 2024-09-05 | End: 2024-09-05 | Stop reason: HOSPADM

## 2024-09-05 RX ORDER — ONDANSETRON 2 MG/ML
4 INJECTION INTRAMUSCULAR; INTRAVENOUS
Status: DISCONTINUED | OUTPATIENT
Start: 2024-09-05 | End: 2024-09-05 | Stop reason: HOSPADM

## 2024-09-05 RX ORDER — MAGNESIUM HYDROXIDE 1200 MG/15ML
LIQUID ORAL
Status: COMPLETED | OUTPATIENT
Start: 2024-09-05 | End: 2024-09-05

## 2024-09-05 RX ORDER — DIPHENHYDRAMINE HYDROCHLORIDE 50 MG/ML
12.5 INJECTION INTRAMUSCULAR; INTRAVENOUS
Status: DISCONTINUED | OUTPATIENT
Start: 2024-09-05 | End: 2024-09-05 | Stop reason: HOSPADM

## 2024-09-05 RX ORDER — SODIUM CHLORIDE, SODIUM LACTATE, POTASSIUM CHLORIDE, CALCIUM CHLORIDE 600; 310; 30; 20 MG/100ML; MG/100ML; MG/100ML; MG/100ML
INJECTION, SOLUTION INTRAVENOUS CONTINUOUS
Status: DISCONTINUED | OUTPATIENT
Start: 2024-09-05 | End: 2024-09-05

## 2024-09-05 RX ORDER — HALOPERIDOL 5 MG/ML
1 INJECTION INTRAMUSCULAR
Status: DISCONTINUED | OUTPATIENT
Start: 2024-09-05 | End: 2024-09-05 | Stop reason: HOSPADM

## 2024-09-05 RX ORDER — MEPERIDINE HYDROCHLORIDE 25 MG/ML
12.5 INJECTION INTRAMUSCULAR; INTRAVENOUS; SUBCUTANEOUS
Status: DISCONTINUED | OUTPATIENT
Start: 2024-09-05 | End: 2024-09-05 | Stop reason: HOSPADM

## 2024-09-05 RX ORDER — EPHEDRINE SULFATE 50 MG/ML
5 INJECTION, SOLUTION INTRAVENOUS
Status: DISCONTINUED | OUTPATIENT
Start: 2024-09-05 | End: 2024-09-05 | Stop reason: HOSPADM

## 2024-09-05 RX ORDER — LIDOCAINE HYDROCHLORIDE 20 MG/ML
INJECTION, SOLUTION EPIDURAL; INFILTRATION; INTRACAUDAL; PERINEURAL PRN
Status: DISCONTINUED | OUTPATIENT
Start: 2024-09-05 | End: 2024-09-05 | Stop reason: SURG

## 2024-09-05 RX ORDER — BUPIVACAINE HYDROCHLORIDE AND EPINEPHRINE 5; 5 MG/ML; UG/ML
INJECTION, SOLUTION EPIDURAL; INTRACAUDAL; PERINEURAL
Status: DISCONTINUED | OUTPATIENT
Start: 2024-09-05 | End: 2024-09-05 | Stop reason: HOSPADM

## 2024-09-05 RX ORDER — HYDROMORPHONE HYDROCHLORIDE 1 MG/ML
0.1 INJECTION, SOLUTION INTRAMUSCULAR; INTRAVENOUS; SUBCUTANEOUS
Status: DISCONTINUED | OUTPATIENT
Start: 2024-09-05 | End: 2024-09-05 | Stop reason: HOSPADM

## 2024-09-05 RX ORDER — OXYCODONE HCL 5 MG/5 ML
10 SOLUTION, ORAL ORAL
Status: COMPLETED | OUTPATIENT
Start: 2024-09-05 | End: 2024-09-05

## 2024-09-05 RX ORDER — CEFAZOLIN SODIUM 1 G/3ML
INJECTION, POWDER, FOR SOLUTION INTRAMUSCULAR; INTRAVENOUS PRN
Status: DISCONTINUED | OUTPATIENT
Start: 2024-09-05 | End: 2024-09-05 | Stop reason: SURG

## 2024-09-05 RX ORDER — HYDROMORPHONE HYDROCHLORIDE 1 MG/ML
.5-1 INJECTION, SOLUTION INTRAMUSCULAR; INTRAVENOUS; SUBCUTANEOUS
Status: DISCONTINUED | OUTPATIENT
Start: 2024-09-05 | End: 2024-09-09 | Stop reason: HOSPADM

## 2024-09-05 RX ORDER — ROCURONIUM BROMIDE 10 MG/ML
INJECTION, SOLUTION INTRAVENOUS PRN
Status: DISCONTINUED | OUTPATIENT
Start: 2024-09-05 | End: 2024-09-05 | Stop reason: SURG

## 2024-09-05 RX ORDER — SODIUM CHLORIDE, SODIUM GLUCONATE, SODIUM ACETATE, POTASSIUM CHLORIDE AND MAGNESIUM CHLORIDE 526; 502; 368; 37; 30 MG/100ML; MG/100ML; MG/100ML; MG/100ML; MG/100ML
INJECTION, SOLUTION INTRAVENOUS
Status: DISCONTINUED | OUTPATIENT
Start: 2024-09-05 | End: 2024-09-05 | Stop reason: SURG

## 2024-09-05 RX ORDER — HYDROMORPHONE HYDROCHLORIDE 1 MG/ML
0.2 INJECTION, SOLUTION INTRAMUSCULAR; INTRAVENOUS; SUBCUTANEOUS
Status: DISCONTINUED | OUTPATIENT
Start: 2024-09-05 | End: 2024-09-05 | Stop reason: HOSPADM

## 2024-09-05 RX ORDER — ONDANSETRON 2 MG/ML
INJECTION INTRAMUSCULAR; INTRAVENOUS PRN
Status: DISCONTINUED | OUTPATIENT
Start: 2024-09-05 | End: 2024-09-05 | Stop reason: SURG

## 2024-09-05 RX ORDER — DEXAMETHASONE SODIUM PHOSPHATE 4 MG/ML
INJECTION, SOLUTION INTRA-ARTICULAR; INTRALESIONAL; INTRAMUSCULAR; INTRAVENOUS; SOFT TISSUE PRN
Status: DISCONTINUED | OUTPATIENT
Start: 2024-09-05 | End: 2024-09-05 | Stop reason: SURG

## 2024-09-05 RX ORDER — HYDRALAZINE HYDROCHLORIDE 20 MG/ML
5 INJECTION INTRAMUSCULAR; INTRAVENOUS
Status: DISCONTINUED | OUTPATIENT
Start: 2024-09-05 | End: 2024-09-05 | Stop reason: HOSPADM

## 2024-09-05 RX ORDER — LABETALOL HYDROCHLORIDE 5 MG/ML
5 INJECTION, SOLUTION INTRAVENOUS
Status: DISCONTINUED | OUTPATIENT
Start: 2024-09-05 | End: 2024-09-05 | Stop reason: HOSPADM

## 2024-09-05 RX ADMIN — OXYCODONE HYDROCHLORIDE 5 MG: 5 TABLET ORAL at 05:07

## 2024-09-05 RX ADMIN — FENTANYL CITRATE 50 MCG: 50 INJECTION, SOLUTION INTRAMUSCULAR; INTRAVENOUS at 16:26

## 2024-09-05 RX ADMIN — PROPOFOL 150 MG: 10 INJECTION, EMULSION INTRAVENOUS at 14:35

## 2024-09-05 RX ADMIN — CEFAZOLIN 2 G: 1 INJECTION, POWDER, FOR SOLUTION INTRAMUSCULAR; INTRAVENOUS at 14:38

## 2024-09-05 RX ADMIN — NICOTINE TRANSDERMAL SYSTEM 21 MG: 21 PATCH, EXTENDED RELEASE TRANSDERMAL at 05:08

## 2024-09-05 RX ADMIN — FENTANYL CITRATE 50 MCG: 50 INJECTION, SOLUTION INTRAMUSCULAR; INTRAVENOUS at 14:59

## 2024-09-05 RX ADMIN — ONDANSETRON 4 MG: 2 INJECTION INTRAMUSCULAR; INTRAVENOUS at 15:44

## 2024-09-05 RX ADMIN — DEXAMETHASONE SODIUM PHOSPHATE 4 MG: 4 INJECTION INTRA-ARTICULAR; INTRALESIONAL; INTRAMUSCULAR; INTRAVENOUS; SOFT TISSUE at 14:38

## 2024-09-05 RX ADMIN — METAXALONE 800 MG: 800 TABLET ORAL at 17:50

## 2024-09-05 RX ADMIN — FENTANYL CITRATE 50 MCG: 50 INJECTION, SOLUTION INTRAMUSCULAR; INTRAVENOUS at 16:37

## 2024-09-05 RX ADMIN — ACETAMINOPHEN 1000 MG: 500 TABLET ORAL at 05:06

## 2024-09-05 RX ADMIN — SUGAMMADEX 200 MG: 100 INJECTION, SOLUTION INTRAVENOUS at 15:44

## 2024-09-05 RX ADMIN — GABAPENTIN 400 MG: 400 CAPSULE ORAL at 05:06

## 2024-09-05 RX ADMIN — FENTANYL CITRATE 100 MCG: 50 INJECTION, SOLUTION INTRAMUSCULAR; INTRAVENOUS at 14:35

## 2024-09-05 RX ADMIN — ENOXAPARIN SODIUM 40 MG: 100 INJECTION SUBCUTANEOUS at 05:08

## 2024-09-05 RX ADMIN — FENTANYL CITRATE 50 MCG: 50 INJECTION, SOLUTION INTRAMUSCULAR; INTRAVENOUS at 15:39

## 2024-09-05 RX ADMIN — METAXALONE 800 MG: 800 TABLET ORAL at 11:35

## 2024-09-05 RX ADMIN — CELECOXIB 200 MG: 100 CAPSULE ORAL at 05:07

## 2024-09-05 RX ADMIN — ENOXAPARIN SODIUM 40 MG: 100 INJECTION SUBCUTANEOUS at 17:50

## 2024-09-05 RX ADMIN — OXYCODONE HYDROCHLORIDE 10 MG: 10 TABLET ORAL at 17:49

## 2024-09-05 RX ADMIN — LIDOCAINE HYDROCHLORIDE 100 MG: 20 INJECTION, SOLUTION EPIDURAL; INFILTRATION; INTRACAUDAL at 14:35

## 2024-09-05 RX ADMIN — METAXALONE 800 MG: 800 TABLET ORAL at 05:07

## 2024-09-05 RX ADMIN — GABAPENTIN 400 MG: 400 CAPSULE ORAL at 21:38

## 2024-09-05 RX ADMIN — OXYCODONE HYDROCHLORIDE 10 MG: 5 SOLUTION ORAL at 16:19

## 2024-09-05 RX ADMIN — ROCURONIUM BROMIDE 50 MG: 50 INJECTION, SOLUTION INTRAVENOUS at 14:35

## 2024-09-05 RX ADMIN — OXYCODONE HYDROCHLORIDE 10 MG: 10 TABLET ORAL at 01:56

## 2024-09-05 RX ADMIN — HYDROMORPHONE HYDROCHLORIDE 0.2 MG: 1 INJECTION, SOLUTION INTRAMUSCULAR; INTRAVENOUS; SUBCUTANEOUS at 16:46

## 2024-09-05 RX ADMIN — LIDOCAINE 1 PATCH: 4 PATCH TOPICAL at 21:09

## 2024-09-05 RX ADMIN — OXYCODONE HYDROCHLORIDE 10 MG: 10 TABLET ORAL at 23:20

## 2024-09-05 RX ADMIN — CEFAZOLIN 2 G: 2 INJECTION, POWDER, FOR SOLUTION INTRAMUSCULAR; INTRAVENOUS at 21:37

## 2024-09-05 RX ADMIN — FENTANYL CITRATE 50 MCG: 50 INJECTION, SOLUTION INTRAMUSCULAR; INTRAVENOUS at 15:42

## 2024-09-05 RX ADMIN — ACETAMINOPHEN 1000 MG: 500 TABLET ORAL at 11:35

## 2024-09-05 RX ADMIN — SODIUM CHLORIDE, SODIUM GLUCONATE, SODIUM ACETATE, POTASSIUM CHLORIDE AND MAGNESIUM CHLORIDE: 526; 502; 368; 37; 30 INJECTION, SOLUTION INTRAVENOUS at 14:31

## 2024-09-05 ASSESSMENT — FIBROSIS 4 INDEX: FIB4 SCORE: 0.86

## 2024-09-05 ASSESSMENT — ENCOUNTER SYMPTOMS
SHORTNESS OF BREATH: 0
HEADACHES: 0
NEUROLOGICAL NEGATIVE: 1
DIZZINESS: 0
TINGLING: 0
ABDOMINAL PAIN: 0
MYALGIAS: 1
SENSORY CHANGE: 0

## 2024-09-05 ASSESSMENT — PAIN SCALES - GENERAL: PAIN_LEVEL: 2

## 2024-09-05 NOTE — OR SURGEON
Immediate Post OP Note    PreOp Diagnosis: Right lateral tibia plateau fracture      PostOp Diagnosis: same      Procedure(s):  ORIF, FRACTURE, TIBIA, PLATEAU - Wound Class: Clean    Surgeon(s):  Raymond Forbes M.D.    Anesthesiologist/Type of Anesthesia:  Anesthesiologist: Rahul Ramos M.D./General    Surgical Staff:  Circulator: Bernice Willis R.N.  Relief Circulator: Arielle Montez R.N.  Scrub Person: Maciej Brooks  Radiology Technologist: Eulalia Ambriz    Specimens removed if any:  * No specimens in log *    Estimated Blood Loss: minimal    Findings: see dictation    Complications: none known    PLAN:  --readmit postop  --TTWB RLE x 8-12 weeks  --okay for knee ROM in unlocked hinged knee brace  --ancef x 2 doses postop  --PT/OT for mobilization  --lovenox and SCDs while inpatient, okay for ASA 81mg BID x 30 days as outpatient  --fu 2 weeks postop        9/5/2024 3:51 PM Raymond Forbes M.D.

## 2024-09-05 NOTE — THERAPY
Occupational Therapy Contact Note    Patient Name: Naomi Baldwin  Age:  49 y.o., Sex:  female  Medical Record #: [id number]  Today's Date: 9/5/2024 09/05/24 1155   Interdisciplinary Plan of Care Collaboration   Collaboration Comments Per chart pt to OR today for repair of tibial plateau. Per notes pt mobilizing to chair daily with nursing. Hold OT today and resume post-op.

## 2024-09-05 NOTE — PROGRESS NOTES
Patient to pre-op for R tibial repair with this ACLS RN and transport. Patient transported via hospital bed on 8 L oxymask and connected to the monitor. VSS during transport.

## 2024-09-05 NOTE — THERAPY
Physical Therapy Contact Note    Patient Name: Naomi Baldwin  Age:  49 y.o., Sex:  female  Medical Record #: [id number]  Today's Date: 9/5/2024    Bedside RN reporting that pt has been mobilizing to the chair daily and just ambulated to the bathroom with use of FWW. Pt is planned to go to the OR for ORIF today. Will hold PT tx at this time and follow up post op to progress mobility as tolerated.     Rl Byrd, PT, DPT

## 2024-09-05 NOTE — CARE PLAN
The patient is Watcher - Medium risk of patient condition declining or worsening    Shift Goals  Clinical Goals: Pulmonary hygiene, pain control, mobilize, wean oxygen as tolerated/able, bowel movement, NPO at midnight for surgery tomorrow 9/5  Patient Goals: Pain control, sleep/rest, bowel movement  Family Goals: Updates, assist w/ care    Progress made toward(s) clinical / shift goals:    Problem: Pain - Standard  Goal: Alleviation of pain or a reduction in pain to the patient’s comfort goal  Outcome: Progressing  Problem: Fall Risk  Goal: Patient will remain free from falls  Outcome: Progressing  Problem: Chest Tube Management  Goal: Complications related to chest tube will be avoided or minimized  Outcome: Progressing  Problem: Skin Integrity  Goal: Skin integrity is maintained or improved  Outcome: Progressing  Problem: Hemodynamics  Goal: Patient's hemodynamics, fluid balance and neurologic status will be stable or improve  Outcome: Progressing  Problem: Respiratory  Goal: Patient will achieve/maintain optimum respiratory ventilation and gas exchange  Outcome: Progressing  Problem: Bowel Elimination  Goal: Establish and maintain regular bowel function  Outcome: Progressing  Problem: Pre Op  Goal: Optimal preparation for surgery  Outcome: Progressing
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
## 2024-09-05 NOTE — ANESTHESIA TIME REPORT
Anesthesia Start and Stop Event Times       Date Time Event    9/5/2024 1421 Ready for Procedure     1431 Anesthesia Start          Responsible Staff  09/05/24      Name Role Begin End    Rahul Ramos M.D. Anesth 1431           Overtime Reason:  no overtime (within assigned shift)    Comments:

## 2024-09-05 NOTE — ANESTHESIA PROCEDURE NOTES
Airway    Date/Time: 9/5/2024 2:37 PM    Performed by: Rahul Ramos M.D.  Authorized by: Rahul Ramos M.D.    Location:  OR  Urgency:  Elective  Indications for Airway Management:  Anesthesia      Spontaneous Ventilation: absent    Sedation Level:  Deep  Preoxygenated: Yes    Patient Position:  Sniffing  Mask Difficulty Assessment:  2 - vent by mask + OA or adjuvant +/- NMBA  Final Airway Type:  Endotracheal airway  Final Endotracheal Airway:  ETT  Cuffed: Yes    Technique Used for Successful ETT Placement:  Direct laryngoscopy  Devices/Methods Used in Placement:  Anterior pressure/BURP    Insertion Site:  Oral  Blade Type:  Oral  Laryngoscope Blade/Videolaryngoscope Blade Size:  3  ETT Size (mm):  7.0  Measured from:  Teeth  ETT to Teeth (cm):  22  Placement Verified by: auscultation and capnometry    Cormack-Lehane Classification:  Grade IIb - view of arytenoids or posterior of glottis only  Number of Attempts at Approach:  1

## 2024-09-05 NOTE — PROGRESS NOTES
49yoF with right displaced lateral tibia plateau fracture.  Multiple left side rib fxs an d PTX with chest tube.    S: NPO awaiting surgery    O:    Vitals:    09/05/24 1318   BP: 137/88   Pulse: 78   Resp: 16   Temp: 35.9 °C (96.7 °F)   SpO2: 94%     Exam:  General-NAD, alert and following commands  RLE-knee brace in place, mild swelling at proximal leg, no blisters, NVI distally    A: 49yoF with right displaced lateral tibia plateau fracture.  Multiple left side rib fxs an d PTX with chest tube.    Recs:  --Planning surgical reduction and fixation of right displaced lateral tibia plateau fracture  --continue NWB RLE and NPO

## 2024-09-05 NOTE — ANESTHESIA POSTPROCEDURE EVALUATION
Patient: Naomi Baldwin    Procedure Summary       Date: 09/05/24 Room / Location: [place] [id number] / SURGERY [facility]    Anesthesia Start: 1431 Anesthesia Stop:     Procedure: ORIF, FRACTURE, TIBIA, PLATEAU (Right: Leg Lower) Diagnosis: (right displaced lateral tibia plateau fracture)    Surgeons: Raymond Forbes M.D. Responsible Provider: Rahul Ramos M.D.    Anesthesia Type: general ASA Status: 3            Final Anesthesia Type: general  Last vitals  BP   Blood Pressure: 137/88    Temp   35.9 °C (96.7 °F)    Pulse   78   Resp   16    SpO2   94 %      Anesthesia Post Evaluation    Patient location during evaluation: PACU  Patient participation: complete - patient participated  Level of consciousness: awake  Pain score: 2    Airway patency: patent  Anesthetic complications: no  Cardiovascular status: hemodynamically stable  Respiratory status: face mask (SpO2 90-92 on 10L)  Hydration status: acceptable    PONV: none          No notable events documented.     Nurse Pain Score: 3 (NPRS)

## 2024-09-06 ENCOUNTER — APPOINTMENT (OUTPATIENT)
Dept: RADIOLOGY | Facility: MEDICAL CENTER | Age: 49
DRG: 982 | End: 2024-09-06
Payer: COMMERCIAL

## 2024-09-06 ENCOUNTER — APPOINTMENT (OUTPATIENT)
Dept: RADIOLOGY | Facility: MEDICAL CENTER | Age: 49
DRG: 982 | End: 2024-09-06
Attending: NURSE PRACTITIONER
Payer: COMMERCIAL

## 2024-09-06 LAB
ANION GAP SERPL CALC-SCNC: 8 MMOL/L (ref 7–16)
BASOPHILS # BLD AUTO: 0.1 % (ref 0–1.8)
BASOPHILS # BLD: 0.01 K/UL (ref 0–0.12)
BUN SERPL-MCNC: 15 MG/DL (ref 8–22)
CALCIUM SERPL-MCNC: 8.7 MG/DL (ref 8.5–10.5)
CHLORIDE SERPL-SCNC: 103 MMOL/L (ref 96–112)
CO2 SERPL-SCNC: 28 MMOL/L (ref 20–33)
CREAT SERPL-MCNC: 0.57 MG/DL (ref 0.5–1.4)
EOSINOPHIL # BLD AUTO: 0.08 K/UL (ref 0–0.51)
EOSINOPHIL NFR BLD: 0.6 % (ref 0–6.9)
ERYTHROCYTE [DISTWIDTH] IN BLOOD BY AUTOMATED COUNT: 49.2 FL (ref 35.9–50)
GFR SERPLBLD CREATININE-BSD FMLA CKD-EPI: 110 ML/MIN/1.73 M 2
GLUCOSE SERPL-MCNC: 92 MG/DL (ref 65–99)
HCT VFR BLD AUTO: 36.5 % (ref 37–47)
HGB BLD-MCNC: 11.9 G/DL (ref 12–16)
IMM GRANULOCYTES # BLD AUTO: 0.07 K/UL (ref 0–0.11)
IMM GRANULOCYTES NFR BLD AUTO: 0.6 % (ref 0–0.9)
LYMPHOCYTES # BLD AUTO: 2.57 K/UL (ref 1–4.8)
LYMPHOCYTES NFR BLD: 20.4 % (ref 22–41)
MCH RBC QN AUTO: 29.6 PG (ref 27–33)
MCHC RBC AUTO-ENTMCNC: 32.6 G/DL (ref 32.2–35.5)
MCV RBC AUTO: 90.8 FL (ref 81.4–97.8)
MONOCYTES # BLD AUTO: 0.86 K/UL (ref 0–0.85)
MONOCYTES NFR BLD AUTO: 6.8 % (ref 0–13.4)
NEUTROPHILS # BLD AUTO: 9 K/UL (ref 1.82–7.42)
NEUTROPHILS NFR BLD: 71.5 % (ref 44–72)
NRBC # BLD AUTO: 0 K/UL
NRBC BLD-RTO: 0 /100 WBC (ref 0–0.2)
PLATELET # BLD AUTO: 277 K/UL (ref 164–446)
PMV BLD AUTO: 10.4 FL (ref 9–12.9)
POTASSIUM SERPL-SCNC: 4.7 MMOL/L (ref 3.6–5.5)
RBC # BLD AUTO: 4.02 M/UL (ref 4.2–5.4)
SODIUM SERPL-SCNC: 139 MMOL/L (ref 135–145)
WBC # BLD AUTO: 12.6 K/UL (ref 4.8–10.8)

## 2024-09-06 PROCEDURE — 700101 HCHG RX REV CODE 250: Performed by: NURSE PRACTITIONER

## 2024-09-06 PROCEDURE — A9270 NON-COVERED ITEM OR SERVICE: HCPCS | Performed by: NURSE PRACTITIONER

## 2024-09-06 PROCEDURE — 71045 X-RAY EXAM CHEST 1 VIEW: CPT

## 2024-09-06 PROCEDURE — 770001 HCHG ROOM/CARE - MED/SURG/GYN PRIV*

## 2024-09-06 PROCEDURE — 97530 THERAPEUTIC ACTIVITIES: CPT

## 2024-09-06 PROCEDURE — 700105 HCHG RX REV CODE 258: Performed by: ORTHOPAEDIC SURGERY

## 2024-09-06 PROCEDURE — 97535 SELF CARE MNGMENT TRAINING: CPT

## 2024-09-06 PROCEDURE — A9270 NON-COVERED ITEM OR SERVICE: HCPCS | Performed by: SURGERY

## 2024-09-06 PROCEDURE — 700111 HCHG RX REV CODE 636 W/ 250 OVERRIDE (IP): Mod: JZ | Performed by: SURGERY

## 2024-09-06 PROCEDURE — 700111 HCHG RX REV CODE 636 W/ 250 OVERRIDE (IP): Performed by: ORTHOPAEDIC SURGERY

## 2024-09-06 PROCEDURE — 700102 HCHG RX REV CODE 250 W/ 637 OVERRIDE(OP): Performed by: SURGERY

## 2024-09-06 PROCEDURE — 80048 BASIC METABOLIC PNL TOTAL CA: CPT

## 2024-09-06 PROCEDURE — 700102 HCHG RX REV CODE 250 W/ 637 OVERRIDE(OP): Performed by: REGISTERED NURSE

## 2024-09-06 PROCEDURE — 85025 COMPLETE CBC W/AUTO DIFF WBC: CPT

## 2024-09-06 PROCEDURE — 99233 SBSQ HOSP IP/OBS HIGH 50: CPT

## 2024-09-06 PROCEDURE — A9270 NON-COVERED ITEM OR SERVICE: HCPCS | Performed by: REGISTERED NURSE

## 2024-09-06 PROCEDURE — 700102 HCHG RX REV CODE 250 W/ 637 OVERRIDE(OP): Performed by: NURSE PRACTITIONER

## 2024-09-06 RX ADMIN — GABAPENTIN 400 MG: 400 CAPSULE ORAL at 14:05

## 2024-09-06 RX ADMIN — GABAPENTIN 400 MG: 400 CAPSULE ORAL at 20:47

## 2024-09-06 RX ADMIN — OXYCODONE HYDROCHLORIDE 10 MG: 10 TABLET ORAL at 12:44

## 2024-09-06 RX ADMIN — OXYCODONE HYDROCHLORIDE 5 MG: 5 TABLET ORAL at 16:12

## 2024-09-06 RX ADMIN — ENOXAPARIN SODIUM 40 MG: 100 INJECTION SUBCUTANEOUS at 05:58

## 2024-09-06 RX ADMIN — GABAPENTIN 400 MG: 400 CAPSULE ORAL at 05:59

## 2024-09-06 RX ADMIN — LIDOCAINE 1 PATCH: 4 PATCH TOPICAL at 18:16

## 2024-09-06 RX ADMIN — METAXALONE 800 MG: 800 TABLET ORAL at 05:59

## 2024-09-06 RX ADMIN — METAXALONE 800 MG: 800 TABLET ORAL at 11:31

## 2024-09-06 RX ADMIN — NICOTINE TRANSDERMAL SYSTEM 21 MG: 21 PATCH, EXTENDED RELEASE TRANSDERMAL at 05:59

## 2024-09-06 RX ADMIN — OXYCODONE HYDROCHLORIDE 5 MG: 5 TABLET ORAL at 04:52

## 2024-09-06 RX ADMIN — OXYCODONE HYDROCHLORIDE 10 MG: 10 TABLET ORAL at 22:25

## 2024-09-06 RX ADMIN — CEFAZOLIN 2 G: 2 INJECTION, POWDER, FOR SOLUTION INTRAMUSCULAR; INTRAVENOUS at 05:57

## 2024-09-06 RX ADMIN — ENOXAPARIN SODIUM 40 MG: 100 INJECTION SUBCUTANEOUS at 18:16

## 2024-09-06 RX ADMIN — METAXALONE 800 MG: 800 TABLET ORAL at 18:16

## 2024-09-06 SDOH — ECONOMIC STABILITY: TRANSPORTATION INSECURITY
IN THE PAST 12 MONTHS, HAS THE LACK OF TRANSPORTATION KEPT YOU FROM MEDICAL APPOINTMENTS OR FROM GETTING MEDICATIONS?: NO

## 2024-09-06 SDOH — ECONOMIC STABILITY: TRANSPORTATION INSECURITY
IN THE PAST 12 MONTHS, HAS LACK OF RELIABLE TRANSPORTATION KEPT YOU FROM MEDICAL APPOINTMENTS, MEETINGS, WORK OR FROM GETTING THINGS NEEDED FOR DAILY LIVING?: NO

## 2024-09-06 ASSESSMENT — COGNITIVE AND FUNCTIONAL STATUS - GENERAL
DAILY ACTIVITIY SCORE: 19
HELP NEEDED FOR BATHING: A LOT
SUGGESTED CMS G CODE MODIFIER DAILY ACTIVITY: CK
WALKING IN HOSPITAL ROOM: A LITTLE
TOILETING: A LITTLE
MOBILITY SCORE: 17
SUGGESTED CMS G CODE MODIFIER MOBILITY: CK
STANDING UP FROM CHAIR USING ARMS: A LITTLE
MOVING FROM LYING ON BACK TO SITTING ON SIDE OF FLAT BED: A LITTLE
DRESSING REGULAR LOWER BODY CLOTHING: A LOT
MOVING TO AND FROM BED TO CHAIR: A LITTLE
TURNING FROM BACK TO SIDE WHILE IN FLAT BAD: A LITTLE
CLIMB 3 TO 5 STEPS WITH RAILING: A LOT

## 2024-09-06 ASSESSMENT — SOCIAL DETERMINANTS OF HEALTH (SDOH)
IN THE PAST 12 MONTHS, HAS THE ELECTRIC, GAS, OIL, OR WATER COMPANY THREATENED TO SHUT OFF SERVICE IN YOUR HOME?: NO
WITHIN THE PAST 12 MONTHS, YOU WORRIED THAT YOUR FOOD WOULD RUN OUT BEFORE YOU GOT THE MONEY TO BUY MORE: NEVER TRUE
WITHIN THE PAST 12 MONTHS, THE FOOD YOU BOUGHT JUST DIDN'T LAST AND YOU DIDN'T HAVE MONEY TO GET MORE: NEVER TRUE

## 2024-09-06 ASSESSMENT — ENCOUNTER SYMPTOMS
DIZZINESS: 0
HEADACHES: 0
MYALGIAS: 1
TINGLING: 0
SENSORY CHANGE: 0
SHORTNESS OF BREATH: 0

## 2024-09-06 ASSESSMENT — GAIT ASSESSMENTS
GAIT LEVEL OF ASSIST: CONTACT GUARD ASSIST
DISTANCE (FEET): 15
DEVIATION: BRADYKINETIC
ASSISTIVE DEVICE: FRONT WHEEL WALKER
DISTANCE (FEET): 15

## 2024-09-06 ASSESSMENT — FIBROSIS 4 INDEX: FIB4 SCORE: 1.28

## 2024-09-06 NOTE — CARE PLAN
The patient is Watcher - Medium risk of patient condition declining or worsening    Problem: Pain - Standard  Goal: Alleviation of pain or a reduction in pain to the patient’s comfort goal  Outcome: Progressing     Problem: Chest Tube Management  Goal: Complications related to chest tube will be avoided or minimized  Outcome: Progressing     Problem: Hemodynamics  Goal: Patient's hemodynamics, fluid balance and neurologic status will be stable or improve  Outcome: Progressing     Shift Goals  Clinical Goals: pulmonary hygine, ORIF R tibia, pain managment  Patient Goals: pain managment, mobility  Family Goals: updates, patients comfort    Progress made toward(s) clinical / shift goals:  met    Patient is not progressing towards the following goals:

## 2024-09-06 NOTE — CARE PLAN
The patient is Stable - Low risk of patient condition declining or worsening    Shift Goals  Clinical Goals: pulmonary hygine, ORIF R tibia, pain managment  Patient Goals: pain managment, mobility  Family Goals: updates, patients comfort    Progress made toward(s) clinical / shift goals:      Problem: Knowledge Deficit - Standard  Goal: Patient and family/care givers will demonstrate understanding of plan of care, disease process/condition, diagnostic tests and medications  Outcome: Progressing     Problem: Pain - Standard  Goal: Alleviation of pain or a reduction in pain to the patient’s comfort goal  Outcome: Progressing     Problem: Fall Risk  Goal: Patient will remain free from falls  Outcome: Progressing     Problem: Chest Tube Management  Goal: Complications related to chest tube will be avoided or minimized  Outcome: Progressing     Problem: Skin Integrity  Goal: Skin integrity is maintained or improved  Outcome: Progressing     Problem: Psychosocial  Goal: Patient's level of anxiety will decrease  Outcome: Progressing  Goal: Patient's ability to verbalize feelings about condition will improve  Outcome: Progressing  Goal: Patient's ability to re-evaluate and adapt role responsibilities will improve  Outcome: Progressing  Goal: Patient and family will demonstrate ability to cope with life altering diagnosis and/or procedure  Outcome: Progressing  Goal: Spiritual and cultural needs incorporated into hospitalization  Outcome: Progressing     Problem: Hemodynamics  Goal: Patient's hemodynamics, fluid balance and neurologic status will be stable or improve  Outcome: Progressing     Problem: Respiratory  Goal: Patient will achieve/maintain optimum respiratory ventilation and gas exchange  Outcome: Progressing     Problem: Venous Thromboembolism (VTE) Prevention  Goal: The patient will remain free from venous thromboembolism (VTE)  Outcome: Progressing     Problem: Nutrition  Goal: Patient's nutritional and fluid  intake will be adequate or improve  Outcome: Progressing     Problem: Urinary Elimination  Goal: Establish and maintain regular urinary output  Outcome: Progressing     Problem: Bowel Elimination  Goal: Establish and maintain regular bowel function  Outcome: Progressing     Problem: Pre Op  Goal: Optimal preparation for surgery  Outcome: Progressing     Problem: Neurovascular Monitoring  Goal: Patient's neurovascular status will be maintained or improve  Outcome: Progressing     Problem: Risk for Post Op Fluid Imbalance  Goal: Promotion of fluid balance  Outcome: Progressing     Problem: Early Mobilization - Post Surgery  Goal: Early mobilization post surgery  Outcome: Progressing     Problem: Pain - Post Surgery  Goal: Alleviation or reduction of pain post surgery  Outcome: Progressing     Problem: Wound/ / Incision Healing  Goal: Patient's wound/surgical incision will decrease in size and heals properly  Outcome: Progressing     Problem: Surgical Drain Management  Goal: Proper management/care of surgical drains will be maintained  Outcome: Progressing     Problem: Bowel Elimination - Post Surgical  Goal: Patient will resume regular bowel sounds and function with no discomfort or distention  Outcome: Progressing     Problem: Incision Care  Goal: Optimal post surgical incision care  Outcome: Progressing       Patient is not progressing towards the following goals:

## 2024-09-06 NOTE — PROGRESS NOTES
Gave report to oncoming RN, all questions answered, patient lying comfortably on 10L oxymask, eating breakfast

## 2024-09-06 NOTE — PROGRESS NOTES
"      Orthopaedic Progress Note    Interval changes:  Patient doing well post op  RLE in hinged knee brace, dressings are CDI  Cleared for DC to home by ortho pending trauma clearance    ROS - Patient denies any new issues.  Pain well controlled.    BP (!) 150/81   Pulse 83   Temp 36.4 °C (97.5 °F) (Temporal)   Resp 20   Ht 1.651 m (5' 5\")   Wt 104 kg (229 lb 4.5 oz)   SpO2 94%     Patient seen and examined  No acute distress  Breathing non labored  RRR  RLE in hinged knee brace, dressings CDI, DNVI, moves all toes, cap refill <2 sec.     Recent Labs     09/04/24  0352 09/05/24  0302 09/06/24  0601   WBC 8.9 9.0 12.6*   RBC 3.82* 3.82* 4.02*   HEMOGLOBIN 11.4* 11.4* 11.9*   HEMATOCRIT 34.6* 34.8* 36.5*   MCV 90.6 91.1 90.8   MCH 29.8 29.8 29.6   MCHC 32.9 32.8 32.6   RDW 49.4 49.1 49.2   PLATELETCT 215 238 277   MPV 10.9 10.6 10.4       Active Hospital Problems    Diagnosis     Closed fracture of multiple ribs [S22.49XA]      Priority: High    Traumatic pneumothorax [S27.0XXA]      Priority: High    Discharge planning issues [Z75.8]      Priority: Medium    Closed fracture of right tibial plateau [S82.141A]      Priority: Medium    Nicotine dependence [F17.200]      Priority: Low    Trauma [T14.90XA]      Priority: Low    No contraindication to deep vein thrombosis (DVT) prophylaxis [Z78.9]      Priority: Low    Fatty liver [K76.0]      Priority: Low    Fracture of transverse process of lumbar vertebra (HCC) [S32.009A]      Priority: Low    COPD (chronic obstructive pulmonary disease) (HCC) [J44.9]        Assessment/Plan:  Patient doing well post op  RLE in hinged knee brace, dressings are CDI  Cleared for DC to home by ortho pending trauma clearance  POD#1 S/P Open treatment with internal fixation, right lateral tibial plateau fracture.  Wt bearing status - TTWB RLE in hinged knee brace unlocked  Wound care/Drains - Dressings to be changed every other day by nursing. Or PRN for saturation starting " POD#2  Future Procedures - none planned   Lovenox: Start 9/1, Duration-until ambulatory > 150'  Sutures/Staples out- 14-21 days post operatively. Removal will completed by ortho mid levels only.  PT/OT-initiated  Antibiotics: Perioperative completed  DVT Prophylaxis- TEDS/SCDs/Foot pumps  Baron-not needed per ortho  Case Coordination for Discharge Planning - Disposition per therapy recs.
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
## 2024-09-06 NOTE — CARE PLAN
Problem: Knowledge Deficit - Standard  Goal: Patient and family/care givers will demonstrate understanding of plan of care, disease process/condition, diagnostic tests and medications  Outcome: Progressing     Problem: Pain - Standard  Goal: Alleviation of pain or a reduction in pain to the patient’s comfort goal  Outcome: Progressing     Problem: Fall Risk  Goal: Patient will remain free from falls  Outcome: Progressing   The patient is Stable - Low risk of patient condition declining or worsening    Shift Goals  Clinical Goals: wean O2, mobilization  Patient Goals: pain management  Family Goals: MARYAM    Progress made toward(s) clinical / shift goals:       Patient is not progressing towards the following goals:

## 2024-09-06 NOTE — THERAPY
"Physical Therapy   Daily Treatment     Patient Name: Naomi Baldwin  Age:  49 y.o., Sex:  female  Medical Record #: [id number]  Today's Date: 9/6/2024     Precautions  Precautions: Fall Risk;Toe Touch Weight Bearing Right Lower Extremity;Immobilizer Right Lower Extremity  Comments: ROMAT in unlocked knee brace    Assessment  Pt seen s/p R tibial plateau fx ORIF on 9/5. Pt is now able to ambulate short distances in the room with use of FWW, limited mildly by fatigue and pain. RLE is still limited as expected, however pt's ROM and strength are slowly progressing. Pt educated on brace use and DC planning as detailed down below. Pt's respiratory status needs to improve prior to DC, however she is anticipated to progress and be able to DC to her family's [facility] with 1 KADEN. Pt will need a 20\" WC on DC as she cannot ambulate household distances currently and will need it to access the community and go to appointments. Will follow while admitted.     Plan    Treatment Plan Status: Continue Current Treatment Plan  Type of Treatment: Bed Mobility, Gait Training, Neuro Re-Education / Balance, Self Care / Home Evaluation, Therapeutic Activities, Stair Training, Therapeutic Exercise  Treatment Frequency: 4 Times per Week  Treatment Duration: Until Therapy Goals Met    DC Equipment Recommendations: Front-Wheel Walker, Wheelchair (needs a 20\" WC with elevating leg rests. WC is prior as the pt can get a FWW on their own if needed)  Discharge Recommendations: Recommend home health for continued physical therapy services        Vitals   O2 (LPM) 5   O2 Delivery Device Oxymask   Vitals Comments broughut up to 7L following mild desat to 88% with activity; RN notified   Pain 0 - 10 Group   Therapist Pain Assessment Post Activity Pain Same as Prior to Activity   Cognition    Cognition / Consciousness WDL   Passive ROM Lower Body   Comments worked on gentle ROM 0-30 degrees of flexion with pt reporting increased pain past   Sitting Lower " Body Exercises   Comments partial ROM LAQ in the chair   Other Treatments   Other Treatments Provided pt requesting ROM of brace to be limited to 50 degrees of flexion at this time   Balance   Sitting Balance (Static) Fair   Sitting Balance (Dynamic) Fair -   Standing Balance (Static) Fair -   Standing Balance (Dynamic) Poor +   Weight Shift Sitting Fair   Skilled Intervention Verbal Cuing   Comments FWW in standing, no LOB   Bed Mobility    Supine to Sit Minimal Assist  (LE assist)   Scooting Standby Assist   Skilled Intervention Verbal Cuing   Comments HOB elevated   Gait Analysis   Gait Level Of Assist Contact Guard Assist   Assistive Device Front Wheel Walker   Distance (Feet) 15   # of Times Distance was Traveled 2   Deviation Bradykinetic  (hopping due to WB precautions)   # of Stairs Climbed 0   Weight Bearing Status TTWB and ROMAT RLE, unlocked brace   Functional Mobility   Sit to Stand Contact Guard Assist   Bed, Chair, Wheelchair Transfer Contact Guard Assist   Toilet Transfers Contact Guard Assist   Mobility FWW   Skilled Intervention Verbal Cuing   Comments cues for sequencing   ICU Target Mobility Level   ICU Mobility - Targeted Level Level 4   6 Clicks Assessment - How much HELP from from another person do you currently need... (If the patient hasn't done an activity recently, how much help from another person do you think he/she would need if he/she tried?)   Turning from your back to your side while in a flat bed without using bedrails? 3   Moving from lying on your back to sitting on the side of a flat bed without using bedrails? 3   Moving to and from a bed to a chair (including a wheelchair)? 3   Standing up from a chair using your arms (e.g., wheelchair, or bedside chair)? 3   Walking in hospital room? 3   Climbing 3-5 steps with a railing? 2   6 clicks Mobility Score 17   Short Term Goals    Short Term Goal # 1 pt will move supine<>eob with spv in 6 tx for bed mobility.   Goal Outcome # 1  Progressing as expected   Short Term Goal # 2 pt will complete spt with fww and spv in 6 tx for functional mobility.   Goal Outcome # 2 Progressing as expected   Short Term Goal # 3 pt will ambulate 50 ft with fww and spv in 6 tx for short household distances.   Goal Outcome # 3 Progressing as expected   Short Term Goal # 4 pt will negotiate 2 stairs with cga and BUE support in 6 tx for home access.   Goal Outcome # 4 Goal not met   Education Group   Additional Comments education with pt on how to don/doff brace, adjust brace ROM and tightness, activity progression in hospital/at home, appropriate AD use with WC/FWW, IS use   Physical Therapy Treatment Plan   Physical Therapy Treatment Plan Continue Current Treatment Plan   Anticipated Discharge Equipment and Recommendations   DC Equipment Recommendations Front-Wheel Walker;Wheelchair   Discharge Recommendations Recommend home health for continued physical therapy services   Interdisciplinary Plan of Care Collaboration   IDT Collaboration with  Nursing;Occupational Therapist   Patient Position at End of Therapy Seated;Chair Alarm On;Call Light within Reach;Tray Table within Reach;Phone within Reach;Family / Friend in Room   Collaboration Comments RN updated   Session Information   Date / Session Number  9/6- 2 (2/4, 9/8) OR 9/5
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
## 2024-09-06 NOTE — DISCHARGE PLANNING
Case Management Discharge Planning    Admission Date: 8/31/2024  GMLOS: 3.2  ALOS: 6    6-Clicks ADL Score: 17  6-Clicks Mobility Score: 12  PT and/or OT Eval ordered: Yes  Post-acute Referrals Ordered: No  Post-acute Choice Obtained: NA  Has referral(s) been sent to post-acute provider:  ANEL      Anticipated Discharge Dispo: Discharge Disposition: Discharged to home/self care (01)     DME Needed: No    Action(s) Taken: Updated Provider/Nurse on Discharge Plan    Escalations Completed: None    Medically Clear: No    Next Steps: Pt discussed in ICU rounds. Pt is stable for transfer to [facility]. Plan for repeat chest x-ray if pneumo is stable will remove chest tube. WBC increased. Requiring O2 at night when sleeping.        PT/OT ordered. PT last seen pt on 9/2 and pt demonstrates potential to progress while admitted and PT to reassess pt post-operatively. OT is recommending post-acute placement; OT to reassess pt post-operatively. [place] made aware of pt's hospitalization and states that pt will only be assigned a  when stable for discharge and if pt has discharge needs. [facility] will continue to follow.     UPDATE 1525: [facility] notified that pt would benefit from having Home Health and will also need a wheelchair. [facility] contacted [facility] to provide update. A  was reassigned and a message was sent to the  to call [facility].      Barriers to Discharge: Medical clearance, Pending PT Evaluation, and Pending Procedures    Is the patient up for discharge tomorrow: No

## 2024-09-06 NOTE — OR NURSING
Report to [facility]  Surgery by Dr Forbes. Dressing CDI.right toes  PWD. Medicated for pain per anesthesia orders.  AXOX4. VSS 90-93% 10L oxymask. Encouraging frequent CDB. All questions addressed,     1716 transported pt to room Sangita RN at bedside . Pt stable.

## 2024-09-06 NOTE — PROGRESS NOTES
4 Eyes Skin Assessment Completed by ROSY Quesada and ROSY Squires.    Patient back from OR    Head Redness; forehead abrasions  Ears Redness and Blanching  Nose WDL  Mouth WDL  Neck Redness  Breast/Chest WDL  Shoulder Blades Redness and Blanching  Spine Redness and Blanching  (R) Arm/Elbow/Hand Redness; bruising to AC and forearm; redness   (L) Arm/Elbow/Hand Redness and Bruising  Abdomen Bruising; previous puncture sites  Groin WDL  Scrotum/Coccyx/Buttocks WDL  (R) Leg MARYAM; surgical dressing in place  (L) Leg Bruising upper leg  (R) Heel/Foot/Toe WDL  (L) Heel/Foot/Toe WDL; MARYAM proximally on foot from toes r/t surgical dressing          Devices In Places ECG, Blood Pressure Cuff, Pulse Ox, SCD's, and Leg Immobilizer      Interventions In Place Gray Ear Foams, Sacral Mepilex, TAP System, Pillows, Q2 Turns, and Low Air Loss Mattress    Possible Skin Injury No    Pictures Uploaded Into Epic N/A  Wound Consult Placed N/A  RN Wound Prevention Protocol Ordered Yes

## 2024-09-06 NOTE — PROGRESS NOTES
Assumed pt care mitzy serivn rn. Bedside report completed, pt resting in bed and no needs at this time

## 2024-09-07 ENCOUNTER — APPOINTMENT (OUTPATIENT)
Dept: RADIOLOGY | Facility: MEDICAL CENTER | Age: 49
DRG: 982 | End: 2024-09-07
Attending: NURSE PRACTITIONER
Payer: COMMERCIAL

## 2024-09-07 LAB
ANION GAP SERPL CALC-SCNC: 13 MMOL/L (ref 7–16)
BASOPHILS # BLD AUTO: 0.4 % (ref 0–1.8)
BASOPHILS # BLD: 0.04 K/UL (ref 0–0.12)
BUN SERPL-MCNC: 12 MG/DL (ref 8–22)
CALCIUM SERPL-MCNC: 8.9 MG/DL (ref 8.5–10.5)
CHLORIDE SERPL-SCNC: 103 MMOL/L (ref 96–112)
CO2 SERPL-SCNC: 26 MMOL/L (ref 20–33)
CREAT SERPL-MCNC: 0.61 MG/DL (ref 0.5–1.4)
EOSINOPHIL # BLD AUTO: 0.61 K/UL (ref 0–0.51)
EOSINOPHIL NFR BLD: 5.6 % (ref 0–6.9)
ERYTHROCYTE [DISTWIDTH] IN BLOOD BY AUTOMATED COUNT: 50.4 FL (ref 35.9–50)
GFR SERPLBLD CREATININE-BSD FMLA CKD-EPI: 108 ML/MIN/1.73 M 2
GLUCOSE SERPL-MCNC: 95 MG/DL (ref 65–99)
HCT VFR BLD AUTO: 36 % (ref 37–47)
HGB BLD-MCNC: 11.6 G/DL (ref 12–16)
IMM GRANULOCYTES # BLD AUTO: 0.07 K/UL (ref 0–0.11)
IMM GRANULOCYTES NFR BLD AUTO: 0.6 % (ref 0–0.9)
LYMPHOCYTES # BLD AUTO: 3.97 K/UL (ref 1–4.8)
LYMPHOCYTES NFR BLD: 36.6 % (ref 22–41)
MCH RBC QN AUTO: 29.6 PG (ref 27–33)
MCHC RBC AUTO-ENTMCNC: 32.2 G/DL (ref 32.2–35.5)
MCV RBC AUTO: 91.8 FL (ref 81.4–97.8)
MONOCYTES # BLD AUTO: 0.95 K/UL (ref 0–0.85)
MONOCYTES NFR BLD AUTO: 8.7 % (ref 0–13.4)
NEUTROPHILS # BLD AUTO: 5.22 K/UL (ref 1.82–7.42)
NEUTROPHILS NFR BLD: 48.1 % (ref 44–72)
NRBC # BLD AUTO: 0 K/UL
NRBC BLD-RTO: 0 /100 WBC (ref 0–0.2)
PLATELET # BLD AUTO: 258 K/UL (ref 164–446)
PMV BLD AUTO: 10.4 FL (ref 9–12.9)
POTASSIUM SERPL-SCNC: 4.3 MMOL/L (ref 3.6–5.5)
RBC # BLD AUTO: 3.92 M/UL (ref 4.2–5.4)
SODIUM SERPL-SCNC: 142 MMOL/L (ref 135–145)
WBC # BLD AUTO: 10.9 K/UL (ref 4.8–10.8)

## 2024-09-07 PROCEDURE — 80048 BASIC METABOLIC PNL TOTAL CA: CPT

## 2024-09-07 PROCEDURE — 700102 HCHG RX REV CODE 250 W/ 637 OVERRIDE(OP): Performed by: REGISTERED NURSE

## 2024-09-07 PROCEDURE — 700111 HCHG RX REV CODE 636 W/ 250 OVERRIDE (IP): Mod: JZ | Performed by: SURGERY

## 2024-09-07 PROCEDURE — 71045 X-RAY EXAM CHEST 1 VIEW: CPT

## 2024-09-07 PROCEDURE — A9270 NON-COVERED ITEM OR SERVICE: HCPCS | Performed by: NURSE PRACTITIONER

## 2024-09-07 PROCEDURE — 36415 COLL VENOUS BLD VENIPUNCTURE: CPT

## 2024-09-07 PROCEDURE — 85025 COMPLETE CBC W/AUTO DIFF WBC: CPT

## 2024-09-07 PROCEDURE — A9270 NON-COVERED ITEM OR SERVICE: HCPCS | Performed by: SURGERY

## 2024-09-07 PROCEDURE — 99232 SBSQ HOSP IP/OBS MODERATE 35: CPT

## 2024-09-07 PROCEDURE — A9270 NON-COVERED ITEM OR SERVICE: HCPCS | Performed by: REGISTERED NURSE

## 2024-09-07 PROCEDURE — 700102 HCHG RX REV CODE 250 W/ 637 OVERRIDE(OP): Performed by: SURGERY

## 2024-09-07 PROCEDURE — 700102 HCHG RX REV CODE 250 W/ 637 OVERRIDE(OP): Performed by: NURSE PRACTITIONER

## 2024-09-07 PROCEDURE — 770001 HCHG ROOM/CARE - MED/SURG/GYN PRIV*

## 2024-09-07 PROCEDURE — 700101 HCHG RX REV CODE 250: Performed by: NURSE PRACTITIONER

## 2024-09-07 RX ADMIN — OXYCODONE HYDROCHLORIDE 10 MG: 10 TABLET ORAL at 09:08

## 2024-09-07 RX ADMIN — ENOXAPARIN SODIUM 40 MG: 100 INJECTION SUBCUTANEOUS at 04:46

## 2024-09-07 RX ADMIN — GABAPENTIN 400 MG: 400 CAPSULE ORAL at 20:56

## 2024-09-07 RX ADMIN — METAXALONE 800 MG: 800 TABLET ORAL at 12:05

## 2024-09-07 RX ADMIN — NICOTINE TRANSDERMAL SYSTEM 21 MG: 21 PATCH, EXTENDED RELEASE TRANSDERMAL at 04:45

## 2024-09-07 RX ADMIN — POLYETHYLENE GLYCOL 3350 1 PACKET: 17 POWDER, FOR SOLUTION ORAL at 16:43

## 2024-09-07 RX ADMIN — METAXALONE 800 MG: 800 TABLET ORAL at 16:42

## 2024-09-07 RX ADMIN — GABAPENTIN 400 MG: 400 CAPSULE ORAL at 13:06

## 2024-09-07 RX ADMIN — ENOXAPARIN SODIUM 40 MG: 100 INJECTION SUBCUTANEOUS at 16:42

## 2024-09-07 RX ADMIN — METAXALONE 800 MG: 800 TABLET ORAL at 04:47

## 2024-09-07 RX ADMIN — OXYCODONE HYDROCHLORIDE 5 MG: 5 TABLET ORAL at 12:55

## 2024-09-07 RX ADMIN — LIDOCAINE 3 PATCH: 4 PATCH TOPICAL at 20:49

## 2024-09-07 RX ADMIN — DOCUSATE SODIUM 100 MG: 100 CAPSULE, LIQUID FILLED ORAL at 16:42

## 2024-09-07 RX ADMIN — MAGNESIUM HYDROXIDE 30 ML: 1200 LIQUID ORAL at 16:42

## 2024-09-07 RX ADMIN — OXYCODONE HYDROCHLORIDE 5 MG: 5 TABLET ORAL at 16:40

## 2024-09-07 RX ADMIN — DOCUSATE SODIUM 100 MG: 100 CAPSULE, LIQUID FILLED ORAL at 04:47

## 2024-09-07 RX ADMIN — OXYCODONE HYDROCHLORIDE 5 MG: 5 TABLET ORAL at 02:09

## 2024-09-07 RX ADMIN — GABAPENTIN 400 MG: 400 CAPSULE ORAL at 04:47

## 2024-09-07 RX ADMIN — OXYCODONE HYDROCHLORIDE 10 MG: 10 TABLET ORAL at 20:40

## 2024-09-07 ASSESSMENT — ENCOUNTER SYMPTOMS
NAUSEA: 0
COUGH: 0
ABDOMINAL PAIN: 0
DIAPHORESIS: 0
VOMITING: 0
SHORTNESS OF BREATH: 0
NEUROLOGICAL NEGATIVE: 1
ROS GI COMMENTS: LAST BM 9/5
EYES NEGATIVE: 1
MYALGIAS: 1
CHILLS: 0
FEVER: 0

## 2024-09-07 NOTE — CARE PLAN
The patient is Stable - Low risk of patient condition declining or worsening    Shift Goals  Clinical Goals: pain control, safety, off purewick  Patient Goals: rest, comfort  Family Goals: not present    Progress made toward(s) clinical / shift goals:    Problem: Knowledge Deficit - Standard  Goal: Patient and family/care givers will demonstrate understanding of plan of care, disease process/condition, diagnostic tests and medications  Outcome: Progressing     Plan of care discussed with patient at beside. Personal belongings and call light with patient in bed.     Problem: Pain - Standard  Goal: Alleviation of pain or a reduction in pain to the patient’s comfort goal  Outcome: Progressing     Patient experiencing pain related to right tibial fracture and recent chest tube removal . Educated to use 0-10 pain scale to determine medication and dosage given to relieve pain. Multimodal methods offered, such as ice and heat packs. Rounding on patient after pain medication admin to determine effectiveness on pain control.     Problem: Fall Risk  Goal: Patient will remain free from falls  Outcome: Progressing     Patient educated to stay in bed and use call light if needing to ambulate in bathroom of have needs addressed. Patient belongings at bedside with patient. Treaded socks placed on pt feet. Bed placed and locked on lowest level. DME placed in pt bathroom to prevent independent ambulation without staff present.     Problem: Skin Integrity  Goal: Skin integrity is maintained or improved  Outcome: Progressing     Problem: Psychosocial  Goal: Patient's level of anxiety will decrease  Outcome: Progressing  Goal: Patient's ability to verbalize feelings about condition will improve  Outcome: Progressing  Goal: Patient's ability to re-evaluate and adapt role responsibilities will improve  Outcome: Progressing  Goal: Patient and family will demonstrate ability to cope with life altering diagnosis and/or procedure  Outcome:  Progressing  Goal: Spiritual and cultural needs incorporated into hospitalization  Outcome: Progressing     Problem: Hemodynamics  Goal: Patient's hemodynamics, fluid balance and neurologic status will be stable or improve  Outcome: Progressing     Problem: Respiratory  Goal: Patient will achieve/maintain optimum respiratory ventilation and gas exchange  Outcome: Progressing     Problem: Nutrition  Goal: Patient's nutritional and fluid intake will be adequate or improve  Outcome: Progressing     Problem: Urinary Elimination  Goal: Establish and maintain regular urinary output  Outcome: Progressing     Problem: Bowel Elimination  Goal: Establish and maintain regular bowel function  Outcome: Progressing     Problem: Pre Op  Goal: Optimal preparation for surgery  Outcome: Progressing     Problem: Neurovascular Monitoring  Goal: Patient's neurovascular status will be maintained or improve  Outcome: Progressing     Problem: Risk for Post Op Fluid Imbalance  Goal: Promotion of fluid balance  Outcome: Progressing     Problem: Early Mobilization - Post Surgery  Goal: Early mobilization post surgery  Outcome: Progressing     Problem: Pain - Post Surgery  Goal: Alleviation or reduction of pain post surgery  Outcome: Progressing     Problem: Wound/ / Incision Healing  Goal: Patient's wound/surgical incision will decrease in size and heals properly  Outcome: Progressing     Problem: Surgical Drain Management  Goal: Proper management/care of surgical drains will be maintained  Outcome: Progressing     Problem: Bowel Elimination - Post Surgical  Goal: Patient will resume regular bowel sounds and function with no discomfort or distention  Outcome: Progressing     Problem: Incision Care  Goal: Optimal post surgical incision care  Outcome: Progressing       Patient is not progressing towards the following goals:

## 2024-09-07 NOTE — PROGRESS NOTES
Face to Face Note  -  Durable Medical Equipment    FLORENCE Nazario - NPI: [id number]  I certify that this patient is under my care and that they have had a durable medical equipment(DME)face to face encounter by myself that meets the physician DME face-to-face encounter requirements with this patient on:    Date of encounter:   Patient:                    MRN:                       YOB: 2026  Naomi Baldwin  [id number]  1975     The encounter with the patient was in whole, or in part, for the following medical condition, which is the primary reason for durable medical equipment:  Post-Op Surgery    I certify that, based on my findings, the following durable medical equipment is medically necessary:  Wheelchair   Patient needs manual wheelchair for use inside the home based on the above diagnosis. Per guidelines patient meets criteria in the following ways:   A.  Patient has significant impairment in the following Toileting and is is unable to complete these tasks in a reasonable timeframe and places the patient at a heightened risk of morbidity.   B.  The patient's mobility limitations cannot be sufficiently resolved by use of  fitted cane or walker.   C.  The patient reports his home provides adequate access between rooms,  maneuvering space, and surfaces for use of the manual wheelchair that is  provided.   D.  The use of the manual wheelchair will significantly improve the patient's  ability to participate in MRADLs and the patient will use it on a regular basis in  the home.   E.  The patient has not expressed an unwillingness to use the manual  wheelchair.   F. The patient has limitations of strength, endurance, range of motion, or coordination per OT notes:.        ------------------------------------------------------------------------------------------------------------------    Face to Face Supporting Documentation - Home Health    The encounter with this patient was  in whole or in part the primary reason for home health admission.    Date of encounter:   Patient:                    MRN:                       YOB: 2026  Naomi Baldwin  [id number]  1975     Home health to see patient for:  Skilled Nursing care for assessment, interventions & education, Physical Therapy evaluation and treatment, and Occupational therapy evaluation and treatment    Skilled need for:  Surgical Aftercare postop orthopedic surgery    Skilled nursing interventions to include:  Comment: nursing assessment    Homebound evidenced status by:  Need the aid of supportive devices such as crutches, canes, wheelchairs or walkers or Needs the assistance of another person in order to leave the home. Leaving home must require a considerable and taxing effort. There must exist a normal inability to leave the home.    Community Physician to provide follow up care: No primary care provider on file.     Optional Interventions    Wound information & treatment:    Home Infusion Therapy orders:    Line/Drain/Airway:    I certify the face to face encounter for this home care referral meets the CMS requirements and the encounter/clinical assessment with the patient was, in whole, or in part, for the medical condition(s) listed above, which is the primary reason for home health care. Based on my clinical findings: the service(s) are medically necessary, support the need for home health care, and the homebound criteria are met.  I certify that this patient has had a face to face encounter by myself.  HOLLY Nazario. - NPI: [id number]    *Debility, frailty and advanced age in the absence of an acute deterioration or exacerbation of a condition do not qualify a patient for home health.

## 2024-09-07 NOTE — THERAPY
"Occupational Therapy  Daily Treatment     Patient Name: Naomi Baldwin  Age:  49 y.o., Sex:  female  Medical Record #: [id number]  Today's Date: 9/6/2024     Precautions: Fall Risk, Toe Touch Weight Bearing Right Lower Extremity, Immobilizer Right Lower Extremity  Comments: ROMAT in unlocked knee brace    Assessment    Pt seen for OT tx. Pt now s/p ORIF to R tibial plateau fx (advanced to TTWB), removal of chest tube. Tx included: bed mobility, transfers, LB dressing, toileting, extensive education as outlined in \"Education Group\" below. Pt's daughter Britney present and supportive. Pt is progressing towards OT goals. Post-acute recommendation updated to home with HH. Pt plans to DC to cousin's H. Updated CM on new recommendations. Will continue to follow while in-house.     Plan    Treatment Plan Status: Continue Current Treatment Plan  Type of Treatment: Self Care / Activities of Daily Living, Adaptive Equipment, Neuro Re-Education / Balance, Therapeutic Exercises, Therapeutic Activity, Family / Caregiver Training, Orthotics Treatment  Treatment Frequency: 4 Times per Week  Treatment Duration: Until Therapy Goals Met    DC Equipment Recommendations: Bed Side Commode, Reacher, Tub / Shower Seat (shower chair if commode does not fit in stall shower)  Discharge Recommendations: Recommend home health for continued occupational therapy services    Subjective    \"I want to go home by Sunday.\"     Objective       09/06/24 1508   Time In/Time Out   Therapy Start Time 1430   Therapy End Time 1508   Total Therapy Time 38   Treatment Charges   Charges Yes   OT Self Care / ADL (Units) 2   OT Therapy Activity (Units) 1   Total Time Spent   OT Time Spent Yes   OT Therapy Activity (Minutes) 12   OT Evaluation (Minutes) 26   OT Total Time Spent (Calculated) 38    Services   Is patient using  services for this encounter? No   Precautions   Precautions Fall Risk;Toe Touch Weight Bearing Right Lower Extremity "   Comments ROMAT in unlocked hinged knee brace   Vitals   Pulse 84   Patient BP Position Sitting   Blood Pressure (!) 145/78   Pulse Oximetry 92 %   O2 (LPM) 5   O2 Delivery Device Silicone Nasal Cannula   Vitals Comments Pt required increase to 7L mask to maintain >90% SpO2 once up to chair   Pain   Pain Scales 0 to 10 Scale    Pain 0 - 10 Group   Location Rib Cage   Therapist Pain Assessment Nurse Notified;Post Activity Pain Same as Prior to Activity  (minimal c/o pain, not rated; agreeable to activity)   Non Verbal Descriptors   Non Verbal Scale  Calm;Unlabored Breathing   Cognition    Cognition / Consciousness WDL   Level of Consciousness Alert   Comments very pleasant, cooperative, motivated   Other Treatments   Other Treatments Provided Extensive education with pt and daughter on home recs; see Education Group   Balance   Sitting Balance (Static) Fair   Sitting Balance (Dynamic) Fair   Standing Balance (Static) Fair -   Standing Balance (Dynamic) Poor +   Weight Shift Sitting Fair   Weight Shift Standing Absent   Skilled Intervention Compensatory Strategies;Postural Facilitation;Tactile Cuing;Verbal Cuing   Comments FWW   Bed Mobility    Supine to Sit Minimal Assist  (HOB elevated)   Sit to Supine   (up to chair post)   Scooting Standby Assist  (seated)   Skilled Intervention Compensatory Strategies;Postural Facilitation;Tactile Cuing;Verbal Cuing   Comments cues to use knee brace to leverage limb   Activities of Daily Living   Lower Body Dressing Standby Assist  (don L sock in half frog sit in bed)   Toileting Minimal Assist  (urination on toilet; pt required assist to wipe due to dangling IV on back of dominant hand)   Skilled Intervention Compensatory Strategies;Verbal Cuing;Postural Facilitation;Tactile Cuing   How much help from another person does the patient currently need...   Putting on and taking off regular lower body clothing? 2   Bathing (including washing, rinsing, and drying)? 2   Toileting,  "which includes using a toilet, bedpan, or urinal? 3   Putting on and taking off regular upper body clothing? 4   Taking care of personal grooming such as brushing teeth? 4   Eating meals? 4   6 Clicks Daily Activity Score 19   Functional Mobility   Sit to Stand Contact Guard Assist   Bed, Chair, Wheelchair Transfer Contact Guard Assist   Toilet Transfers Contact Guard Assist   Transfer Method Stand Step  (FWW)   Mobility Supine > EOB, short gait to/from bathroom   Distance (Feet) 15   # of Times Distance was Traveled 2   Skilled Intervention Compensatory Strategies;Postural Facilitation;Tactile Cuing;Verbal Cuing   Activity Tolerance   Sitting in Chair >10 min; up post   Sitting Edge of Bed 5 min   Standing 3-4 min total   Patient / Family Goals   Patient / Family Goal #1 \"To be mobile\"   Goal #1 Outcome Progressing as expected   Short Term Goals   Short Term Goal # 1 Pt will complete ADL/toilet transfers with supv   Goal Outcome # 1 Progressing as expected   Short Term Goal # 2 Pt will complete toileting with supv   Goal Outcome # 2 Progressing as expected   Short Term Goal # 3 Pt will complete LB dressing with min A using AE   Goal Outcome # 3 Progressing as expected   Education Group   Education Provided Home Safety;Adaptive Equipment;Weight Bearing Precautions;Transfers   Home Safety Patient Response Patient;Family;Acceptance;Explanation;Verbal Demonstration  (Recommend use of 3:1 commode bedside at night and as shower chair)   Transfers Patient Response Patient;Acceptance;Family;Explanation;Demonstration;Verbal Demonstration  (Sequence for stepping in/out of stall shower with FWW within TTWB)   Adaptive Equipment Patient Response Patient;Family;Acceptance;Explanation;Verbal Demonstration  (Use of reacher for object retrieval)   Weight Bearing Precautions Patient Response Patient;Acceptance;Family;Explanation;Demonstration;Verbal Demonstration;Action Demonstration  (TTWB RLE)   Additional Comments Educated " daughter on obtaining transport chair for travel to [facility] for use to access toileting at rest areas. Also educated on appropriate technique for vehicle transfer   Occupational Therapy Treatment Plan    O.T. Treatment Plan Continue Current Treatment Plan   Anticipated Discharge Equipment and Recommendations   DC Equipment Recommendations Bed Side Commode;Reacher;Tub / Shower Seat  (shower chair if commode does not fit in stall shower)   Discharge Recommendations Recommend home health for continued occupational therapy services   Interdisciplinary Plan of Care Collaboration   IDT Collaboration with  Nursing;Physical Therapist;   Patient Position at End of Therapy Seated;Call Light within Reach;Tray Table within Reach;Family / Friend in Room;Phone within Reach   Collaboration Comments OT results and recs   Session Information   Date / Session Number  9/6 #2 (2/4, 9/8)  (9/5 Attempted tx)     Patient seen for team treatment with Physical Therapist for the following reason(s):  Patient required 2 person assistance for safety and to provide effective interventions. Each discipline assisted patient with appropriate and separate goals. Due to the medical complexity, the skill of both practitioners is needed to monitor vitals, patient status, and adjust the intervention to fit the patient's needs and goals.  Occupational Therapist facilitated safe ADL while Physical Therapist simultaneously treated pt according to POC.
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
## 2024-09-07 NOTE — PROGRESS NOTES
"      Orthopaedic Progress Note    Interval changes:  Patient doing well    RLE in hinged knee brace, dressings are CDI  Cleared for DC to home by ortho pending trauma clearance    ROS - Patient denies any new issues.  Pain well controlled.    BP (!) 144/89   Pulse (!) 104   Temp 36.6 °C (97.9 °F) (Temporal)   Resp 15   Ht 1.651 m (5' 5\")   Wt 104 kg (229 lb 4.5 oz)   SpO2 91%     Patient seen and examined  No acute distress  Breathing non labored  RRR  RLE in hinged knee brace, dressings CDI, DNVI, moves all toes, cap refill <2 sec.     Recent Labs     09/05/24  0302 09/06/24  0601 09/07/24  0332   WBC 9.0 12.6* 10.9*   RBC 3.82* 4.02* 3.92*   HEMOGLOBIN 11.4* 11.9* 11.6*   HEMATOCRIT 34.8* 36.5* 36.0*   MCV 91.1 90.8 91.8   MCH 29.8 29.6 29.6   MCHC 32.8 32.6 32.2   RDW 49.1 49.2 50.4*   PLATELETCT 238 277 258   MPV 10.6 10.4 10.4       Active Hospital Problems    Diagnosis     Closed fracture of right tibial plateau [S82.141A]      Priority: High    Discharge planning issues [Z75.8]      Priority: Medium    COPD (chronic obstructive pulmonary disease) ([facility]) [J44.9]      Priority: Medium    Closed fracture of multiple ribs [S22.49XA]      Priority: Medium    Traumatic pneumothorax [S27.0XXA]      Priority: Medium    Nicotine dependence [F17.200]      Priority: Low    Trauma [T14.90XA]      Priority: Low    No contraindication to deep vein thrombosis (DVT) prophylaxis [Z78.9]      Priority: Low    Fatty liver [K76.0]      Priority: Low    Fracture of transverse process of lumbar vertebra (HCC) [S32.009A]      Priority: Low       Assessment/Plan:  Patient doing well    RLE in hinged knee brace, dressings are CDI  Cleared for DC to home by ortho pending trauma clearance  POD#2 S/P Open treatment with internal fixation, right lateral tibial plateau fracture.  Wt bearing status - TTWB RLE in hinged knee brace unlocked  Wound care/Drains - Dressings to be changed every other day by nursing. Or PRN for saturation " starting POD#2  Future Procedures - none planned   Lovenox: Start 9/1, Duration-until ambulatory > 150'  Sutures/Staples out- 14-21 days post operatively. Removal will completed by ortho mid levels only.  PT/OT-initiated  Antibiotics: Perioperative completed  DVT Prophylaxis- TEDS/SCDs/Foot pumps  Baron-not needed per ortho  Case Coordination for Discharge Planning - Disposition per therapy recs.

## 2024-09-07 NOTE — DISCHARGE PLANNING
Case Management Discharge Planning    Admission Date: 8/31/2024  GMLOS: 3.2  ALOS: 7    6-Clicks ADL Score: 19  6-Clicks Mobility Score: 17  PT and/or OT Eval ordered: Yes  Post-acute Referrals Ordered: Yes  Post-acute Choice Obtained: Yes  Has referral(s) been sent to post-acute provider:  Yes      Anticipated Discharge Dispo: Discharge Disposition: Discharged to home/self care (01)    DME Needed: No    Action(s) Taken: RNCM was informed patient is medically cleared to discharge home. Patient is needing HH and home oxygen. RNCM placed call to [facility] [phone] and spoke with assigned MARTHA Means who stated patient will be contacted once discharge to set up HH. RNCM voalted bedside RN to complete walking home O2.  1445: Oxygen order was placed. RNCM placed call to [facility] [phone] and left VM for MARTHA Means informing her of oxygen order and requested a call back.   1600: RNCM placed call to [facility] [phone] to speak with MARTHA Means, no answer.      Escalations Completed: None    Medically Clear: Yes    Next Steps: pending oxygen    Barriers to Discharge: Oxygen Delivery    Is the patient up for discharge tomorrow: No

## 2024-09-07 NOTE — PROGRESS NOTES
1650 Report received from Leonor GALLEGOS.    1711[facility] to hospital bed with x1 assist with FWW. Pt AAOx4, VSS on 5L oxygen. Surgical dressing to right leg CDI. Pt oriented to room and to call light. Discussed plan of care. All needs met at this time. Bed locked and in lowest position.
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
## 2024-09-07 NOTE — CARE PLAN
The patient is Stable - Low risk of patient condition declining or worsening    Shift Goals  Clinical Goals: pain mgt, safety  Patient Goals: rest and comfort  Family Goals: MARYAM    Progress made toward(s) clinical / shift goals:      Patient is aox4, denies any SOB/Chest pain/N and V. Able to follow commands, dressing is C/D/I.      Problem: Pain - Standard  Goal: Alleviation of pain or a reduction in pain to the patient’s comfort goal  Description: Target End Date:  Prior to discharge or change in level of care    Document on Vitals flowsheet    1.  Document pain using the appropriate pain scale per order or unit policy  2.  Educate and implement non-pharmacologic comfort measures (i.e. relaxation, distraction, massage, cold/heat therapy, etc.)  3.  Pain management medications as ordered  4.  Reassess pain after pain med administration per policy  5.  If opiods administered assess patient's response to pain medication is appropriate per POSS sedation scale  6.  Follow pain management plan developed in collaboration with patient and interdisciplinary team (including palliative care or pain specialists if applicable)  Outcome: Progressing   Denies any numbness or tingling sensation, non-pharmacological and pharmacological intervention in place. Instructed to call RN if pain is gradually increasing.      Problem: Fall Risk  Goal: Patient will remain free from falls  Description: Target End Date:  Prior to discharge or change in level of care    Document interventions on the Rangel Torrey Fall Risk Assessment    1.  Assess for fall risk factors  2.  Implement fall precautions  Outcome: Progressing   Refused bed alarm and SCDs, educated and instructed to call RN for assistance if getting OOB. DME out of sight, call lights and belongings within reach.Hourly rounds in place.    Patient is not progressing towards the following goals:

## 2024-09-07 NOTE — FACE TO FACE
"Face to Face Note  -  Durable Medical Equipment    FLORENCE Nazario - NPI: [id number]  I certify that this patient is under my care and that they had a durable medical equipment(DME)face to face encounter by myself that meets the physician DME face-to-face encounter requirements with this patient on:    Date of encounter:   Patient:                    MRN:                       YOB: 2026  Naomi Baldwin  [id number]  1975     The encounter with the patient was in whole, or in part, for the following medical condition, which is the primary reason for durable medical equipment:  COPD    I certify that, based on my findings, the following durable medical equipment is medically necessary:    Oxygen   HOME O2 Saturation Measurements:(Values must be present for Home Oxygen orders)  Room air sat at rest: 87  Room air sat with amb: 80  With liters of O2: 4, O2 sat at rest with O2: 91  With Liters of O2: 4, O2 sat with amb with O2 : 90  Is the patient mobile?: Yes  If patient feels more short of breath, they can go up to 6 liters per minute and contact healthcare provider.    Supporting Symptoms: The patient requires supplemental oxygen, as the following interventions have been tried with limited or no improvement: \"Positive expiratory pressure therapies, \"Ambulation with oximetry, and \"Incentive spirometry.    My Clinical findings support the need for the above equipment due to:  Hypoxia  "

## 2024-09-08 ENCOUNTER — APPOINTMENT (OUTPATIENT)
Dept: RADIOLOGY | Facility: MEDICAL CENTER | Age: 49
DRG: 982 | End: 2024-09-08
Payer: COMMERCIAL

## 2024-09-08 ENCOUNTER — APPOINTMENT (OUTPATIENT)
Dept: RADIOLOGY | Facility: MEDICAL CENTER | Age: 49
DRG: 982 | End: 2024-09-08
Attending: NURSE PRACTITIONER
Payer: COMMERCIAL

## 2024-09-08 PROBLEM — Z75.8 DISCHARGE PLANNING ISSUES: Status: RESOLVED | Noted: 2024-09-05 | Resolved: 2024-09-08

## 2024-09-08 LAB
ANION GAP SERPL CALC-SCNC: 12 MMOL/L (ref 7–16)
APPEARANCE UR: CLEAR
BACTERIA #/AREA URNS HPF: NEGATIVE /HPF
BASOPHILS # BLD AUTO: 0.5 % (ref 0–1.8)
BASOPHILS # BLD: 0.06 K/UL (ref 0–0.12)
BILIRUB UR QL STRIP.AUTO: NEGATIVE
BUN SERPL-MCNC: 15 MG/DL (ref 8–22)
CALCIUM SERPL-MCNC: 9.2 MG/DL (ref 8.5–10.5)
CHLORIDE SERPL-SCNC: 101 MMOL/L (ref 96–112)
CO2 SERPL-SCNC: 26 MMOL/L (ref 20–33)
COLOR UR: YELLOW
CREAT SERPL-MCNC: 0.72 MG/DL (ref 0.5–1.4)
EOSINOPHIL # BLD AUTO: 0.61 K/UL (ref 0–0.51)
EOSINOPHIL NFR BLD: 4.6 % (ref 0–6.9)
EPI CELLS #/AREA URNS HPF: NEGATIVE /HPF
ERYTHROCYTE [DISTWIDTH] IN BLOOD BY AUTOMATED COUNT: 49.8 FL (ref 35.9–50)
GFR SERPLBLD CREATININE-BSD FMLA CKD-EPI: 101 ML/MIN/1.73 M 2
GLUCOSE SERPL-MCNC: 95 MG/DL (ref 65–99)
GLUCOSE UR STRIP.AUTO-MCNC: NEGATIVE MG/DL
HCT VFR BLD AUTO: 36.7 % (ref 37–47)
HGB BLD-MCNC: 11.6 G/DL (ref 12–16)
HYALINE CASTS #/AREA URNS LPF: ABNORMAL /LPF
IMM GRANULOCYTES # BLD AUTO: 0.07 K/UL (ref 0–0.11)
IMM GRANULOCYTES NFR BLD AUTO: 0.5 % (ref 0–0.9)
KETONES UR STRIP.AUTO-MCNC: NEGATIVE MG/DL
LEUKOCYTE ESTERASE UR QL STRIP.AUTO: NEGATIVE
LYMPHOCYTES # BLD AUTO: 4.12 K/UL (ref 1–4.8)
LYMPHOCYTES NFR BLD: 31.2 % (ref 22–41)
MCH RBC QN AUTO: 28.9 PG (ref 27–33)
MCHC RBC AUTO-ENTMCNC: 31.6 G/DL (ref 32.2–35.5)
MCV RBC AUTO: 91.3 FL (ref 81.4–97.8)
MICRO URNS: ABNORMAL
MONOCYTES # BLD AUTO: 1.17 K/UL (ref 0–0.85)
MONOCYTES NFR BLD AUTO: 8.9 % (ref 0–13.4)
NEUTROPHILS # BLD AUTO: 7.17 K/UL (ref 1.82–7.42)
NEUTROPHILS NFR BLD: 54.3 % (ref 44–72)
NITRITE UR QL STRIP.AUTO: NEGATIVE
NRBC # BLD AUTO: 0 K/UL
NRBC BLD-RTO: 0 /100 WBC (ref 0–0.2)
PH UR STRIP.AUTO: 6 [PH] (ref 5–8)
PLATELET # BLD AUTO: 301 K/UL (ref 164–446)
PMV BLD AUTO: 10.2 FL (ref 9–12.9)
POTASSIUM SERPL-SCNC: 4.3 MMOL/L (ref 3.6–5.5)
PROT UR QL STRIP: NEGATIVE MG/DL
RBC # BLD AUTO: 4.02 M/UL (ref 4.2–5.4)
RBC # URNS HPF: ABNORMAL /HPF
RBC UR QL AUTO: ABNORMAL
SODIUM SERPL-SCNC: 139 MMOL/L (ref 135–145)
SP GR UR STRIP.AUTO: 1.01
UROBILINOGEN UR STRIP.AUTO-MCNC: 0.2 MG/DL
WBC # BLD AUTO: 13.2 K/UL (ref 4.8–10.8)
WBC #/AREA URNS HPF: ABNORMAL /HPF

## 2024-09-08 PROCEDURE — 93970 EXTREMITY STUDY: CPT

## 2024-09-08 PROCEDURE — 770001 HCHG ROOM/CARE - MED/SURG/GYN PRIV*

## 2024-09-08 PROCEDURE — A9270 NON-COVERED ITEM OR SERVICE: HCPCS | Performed by: NURSE PRACTITIONER

## 2024-09-08 PROCEDURE — 700111 HCHG RX REV CODE 636 W/ 250 OVERRIDE (IP): Mod: JZ | Performed by: SURGERY

## 2024-09-08 PROCEDURE — 36415 COLL VENOUS BLD VENIPUNCTURE: CPT

## 2024-09-08 PROCEDURE — 700101 HCHG RX REV CODE 250: Performed by: NURSE PRACTITIONER

## 2024-09-08 PROCEDURE — 99232 SBSQ HOSP IP/OBS MODERATE 35: CPT

## 2024-09-08 PROCEDURE — 700102 HCHG RX REV CODE 250 W/ 637 OVERRIDE(OP): Performed by: NURSE PRACTITIONER

## 2024-09-08 PROCEDURE — A9270 NON-COVERED ITEM OR SERVICE: HCPCS | Performed by: SURGERY

## 2024-09-08 PROCEDURE — 700102 HCHG RX REV CODE 250 W/ 637 OVERRIDE(OP): Performed by: SURGERY

## 2024-09-08 PROCEDURE — 81001 URINALYSIS AUTO W/SCOPE: CPT

## 2024-09-08 PROCEDURE — 71045 X-RAY EXAM CHEST 1 VIEW: CPT

## 2024-09-08 PROCEDURE — 85025 COMPLETE CBC W/AUTO DIFF WBC: CPT

## 2024-09-08 PROCEDURE — A9270 NON-COVERED ITEM OR SERVICE: HCPCS | Performed by: REGISTERED NURSE

## 2024-09-08 PROCEDURE — 93970 EXTREMITY STUDY: CPT | Mod: 26 | Performed by: INTERNAL MEDICINE

## 2024-09-08 PROCEDURE — 700102 HCHG RX REV CODE 250 W/ 637 OVERRIDE(OP): Performed by: REGISTERED NURSE

## 2024-09-08 PROCEDURE — 80048 BASIC METABOLIC PNL TOTAL CA: CPT

## 2024-09-08 RX ADMIN — ENOXAPARIN SODIUM 40 MG: 100 INJECTION SUBCUTANEOUS at 16:41

## 2024-09-08 RX ADMIN — METAXALONE 800 MG: 800 TABLET ORAL at 16:41

## 2024-09-08 RX ADMIN — GABAPENTIN 400 MG: 400 CAPSULE ORAL at 20:30

## 2024-09-08 RX ADMIN — OXYCODONE HYDROCHLORIDE 10 MG: 10 TABLET ORAL at 18:10

## 2024-09-08 RX ADMIN — GABAPENTIN 400 MG: 400 CAPSULE ORAL at 04:25

## 2024-09-08 RX ADMIN — LIDOCAINE 3 PATCH: 4 PATCH TOPICAL at 18:09

## 2024-09-08 RX ADMIN — NICOTINE TRANSDERMAL SYSTEM 21 MG: 21 PATCH, EXTENDED RELEASE TRANSDERMAL at 04:25

## 2024-09-08 RX ADMIN — OXYCODONE HYDROCHLORIDE 10 MG: 10 TABLET ORAL at 03:27

## 2024-09-08 RX ADMIN — OXYCODONE HYDROCHLORIDE 10 MG: 10 TABLET ORAL at 21:33

## 2024-09-08 RX ADMIN — METAXALONE 800 MG: 800 TABLET ORAL at 04:25

## 2024-09-08 RX ADMIN — OXYCODONE HYDROCHLORIDE 10 MG: 10 TABLET ORAL at 14:58

## 2024-09-08 RX ADMIN — ENOXAPARIN SODIUM 40 MG: 100 INJECTION SUBCUTANEOUS at 04:28

## 2024-09-08 RX ADMIN — DOCUSATE SODIUM 100 MG: 100 CAPSULE, LIQUID FILLED ORAL at 16:42

## 2024-09-08 RX ADMIN — METAXALONE 800 MG: 800 TABLET ORAL at 11:54

## 2024-09-08 RX ADMIN — GABAPENTIN 400 MG: 400 CAPSULE ORAL at 14:58

## 2024-09-08 ASSESSMENT — ENCOUNTER SYMPTOMS
DIAPHORESIS: 0
NEUROLOGICAL NEGATIVE: 1
ROS GI COMMENTS: LAST BM 9/7
FEVER: 0
WHEEZING: 0
COUGH: 0
CHILLS: 0
MYALGIAS: 1
SHORTNESS OF BREATH: 0
SPUTUM PRODUCTION: 0
GASTROINTESTINAL NEGATIVE: 1
EYES NEGATIVE: 1

## 2024-09-08 NOTE — DISCHARGE INSTRUCTIONS
- Call or seek medical attention for questions or concerns  - Follow up with the [facility] RETURN: PRN  - Follow up with a local orthopedic surgeon as soon as possible upon your return to [place]  - Establish care with a primary care provider as soon as possible to set up home health and your COPD  - Follow up with primary care provider within one weeks time  - Resume regular diet  - May take over the counter acetaminophen or ibuprofen as needed for pain  - Continue daily over the counter stool softener while on narcotics  - No operation of machinery or motorized vehicles while under the influence of narcotics  - No alcohol, marijuana or illicit drug use while under the influence of narcotics  - In the event of a narcotic overdose naloxone (Narcan) is available without a prescription from any [facility] or [facility]  - No swimming, hot tubs, baths or wound submersion until cleared by outpatient provider. May shower  - No contact sports, strenuous activities, or heavy lifting until cleared by outpatient provider  - If respiratory decompensation, persistent or worsening pain, or signs or symptoms of infection occur seek medical attention

## 2024-09-08 NOTE — CARE PLAN
The patient is Stable - Low risk of patient condition declining or worsening    Shift Goals  Clinical Goals: wean O2, obtain proper DME, pain control  Patient Goals: go home, pain control, comfort  Family Goals: n/a    Progress made toward(s) clinical / shift goals:  yes      Problem: Knowledge Deficit - Standard  Goal: Patient and family/care givers will demonstrate understanding of plan of care, disease process/condition, diagnostic tests and medications  Outcome: Progressing     Problem: Pain - Standard  Goal: Alleviation of pain or a reduction in pain to the patient’s comfort goal  Outcome: Progressing  Note: Paitient educated on 0 to 10 pain scale. Pain managed with pharmacologic and non-pharmacological interventions. See MAR. Pt verbilized understanding of interventions.      Problem: Fall Risk  Goal: Patient will remain free from falls  Outcome: Progressing     Problem: Skin Integrity  Goal: Skin integrity is maintained or improved  Outcome: Progressing     Problem: Psychosocial  Goal: Patient's level of anxiety will decrease  Outcome: Progressing  Goal: Patient's ability to verbalize feelings about condition will improve  Outcome: Progressing  Goal: Patient's ability to re-evaluate and adapt role responsibilities will improve  Outcome: Progressing  Goal: Patient and family will demonstrate ability to cope with life altering diagnosis and/or procedure  Outcome: Progressing  Goal: Spiritual and cultural needs incorporated into hospitalization  Outcome: Progressing     Problem: Hemodynamics  Goal: Patient's hemodynamics, fluid balance and neurologic status will be stable or improve  Outcome: Progressing     Problem: Respiratory  Goal: Patient will achieve/maintain optimum respiratory ventilation and gas exchange  Outcome: Progressing     Problem: Venous Thromboembolism (VTE) Prevention  Goal: The patient will remain free from venous thromboembolism (VTE)  Outcome: Progressing     Problem: Nutrition  Goal:  Patient's nutritional and fluid intake will be adequate or improve  Outcome: Progressing     Problem: Urinary Elimination  Goal: Establish and maintain regular urinary output  Outcome: Progressing     Problem: Bowel Elimination  Goal: Establish and maintain regular bowel function  Outcome: Progressing     Problem: Pre Op  Goal: Optimal preparation for surgery  Outcome: Progressing     Problem: Neurovascular Monitoring  Goal: Patient's neurovascular status will be maintained or improve  Outcome: Progressing     Problem: Early Mobilization - Post Surgery  Goal: Early mobilization post surgery  Outcome: Progressing  Note: Pt educated on early ambulation post surgery and use of call light for assistance. Pt was assessed for proper DME usage and amount of assistance. Pt verbalized understanding.     Problem: Pain - Post Surgery  Goal: Alleviation or reduction of pain post surgery  Outcome: Progressing     Problem: Wound/ / Incision Healing  Goal: Patient's wound/surgical incision will decrease in size and heals properly  Outcome: Progressing     Problem: Bowel Elimination - Post Surgical  Goal: Patient will resume regular bowel sounds and function with no discomfort or distention  Outcome: Progressing     Problem: Incision Care  Goal: Optimal post surgical incision care  Outcome: Progressing  Note: Pt educated on signs of infection. Pt verbalizes understanding. Dressing change(s) completed.

## 2024-09-08 NOTE — DISCHARGE PLANNING
Received update from bedside RN that April MARTHA from [facility] requesting call from RNCM. Placed call to April [phone] with [facility]. Transferred to Clary Cottrell CM assigned to this patient. Patient needing wc, fww and home o2, will need o2 for her 4 hours drive home. Left  for Clary requesting call back.
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
## 2024-09-08 NOTE — CARE PLAN
The patient is Stable - Low risk of patient condition declining or worsening    Shift Goals  Clinical Goals: BM, safety, pain mgt, O2 weaning  Patient Goals: rest and to go home  Family Goals: not present    Progress made toward(s) clinical / shift goals:      Patient is aox4, denies any SOB/Chest pain/N and V. Able to follow commands, dressing is C/D/I.      Problem: Pain - Standard  Goal: Alleviation of pain or a reduction in pain to the patient’s comfort goal  Description: Target End Date:  Prior to discharge or change in level of care    Document on Vitals flowsheet    1.  Document pain using the appropriate pain scale per order or unit policy  2.  Educate and implement non-pharmacologic comfort measures (i.e. relaxation, distraction, massage, cold/heat therapy, etc.)  3.  Pain management medications as ordered  4.  Reassess pain after pain med administration per policy  5.  If opiods administered assess patient's response to pain medication is appropriate per POSS sedation scale  6.  Follow pain management plan developed in collaboration with patient and interdisciplinary team (including palliative care or pain specialists if applicable)  Outcome: Progressing   Denies any numbness or tingling sensation, non-pharmacological and pharmacological intervention in place. Instructed to call RN if pain is gradually increasing.      Problem: Fall Risk  Goal: Patient will remain free from falls  Description: Target End Date:  Prior to discharge or change in level of care    Document interventions on the Rangel Torrey Fall Risk Assessment    1.  Assess for fall risk factors  2.  Implement fall precautions  Outcome: Progressing   Refused bed alarm and SCDs, educated and instructed to call RN for assistance if getting OOB. DME out of sight, call lights and belongings within reach.Hourly rounds in place.    Patient is not progressing towards the following goals:
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
## 2024-09-09 ENCOUNTER — PHARMACY VISIT (OUTPATIENT)
Dept: PHARMACY | Facility: MEDICAL CENTER | Age: 49
End: 2024-09-09
Payer: COMMERCIAL

## 2024-09-09 ENCOUNTER — HOSPITAL ENCOUNTER (INPATIENT)
Dept: RADIOLOGY | Facility: MEDICAL CENTER | Age: 49
DRG: 982 | End: 2024-09-09
Attending: NURSE PRACTITIONER
Payer: COMMERCIAL

## 2024-09-09 VITALS
HEIGHT: 65 IN | HEART RATE: 73 BPM | WEIGHT: 229.28 LBS | OXYGEN SATURATION: 95 % | BODY MASS INDEX: 38.2 KG/M2 | DIASTOLIC BLOOD PRESSURE: 73 MMHG | RESPIRATION RATE: 15 BRPM | SYSTOLIC BLOOD PRESSURE: 110 MMHG | TEMPERATURE: 97.6 F

## 2024-09-09 LAB
ANION GAP SERPL CALC-SCNC: 12 MMOL/L (ref 7–16)
BASOPHILS # BLD AUTO: 0.5 % (ref 0–1.8)
BASOPHILS # BLD: 0.06 K/UL (ref 0–0.12)
BUN SERPL-MCNC: 15 MG/DL (ref 8–22)
CALCIUM SERPL-MCNC: 9.2 MG/DL (ref 8.5–10.5)
CHLORIDE SERPL-SCNC: 100 MMOL/L (ref 96–112)
CO2 SERPL-SCNC: 27 MMOL/L (ref 20–33)
CREAT SERPL-MCNC: 0.76 MG/DL (ref 0.5–1.4)
EOSINOPHIL # BLD AUTO: 0.75 K/UL (ref 0–0.51)
EOSINOPHIL NFR BLD: 6.5 % (ref 0–6.9)
ERYTHROCYTE [DISTWIDTH] IN BLOOD BY AUTOMATED COUNT: 51.7 FL (ref 35.9–50)
GFR SERPLBLD CREATININE-BSD FMLA CKD-EPI: 94 ML/MIN/1.73 M 2
GLUCOSE SERPL-MCNC: 94 MG/DL (ref 65–99)
HCT VFR BLD AUTO: 36.9 % (ref 37–47)
HGB BLD-MCNC: 11.8 G/DL (ref 12–16)
IMM GRANULOCYTES # BLD AUTO: 0.08 K/UL (ref 0–0.11)
IMM GRANULOCYTES NFR BLD AUTO: 0.7 % (ref 0–0.9)
LYMPHOCYTES # BLD AUTO: 4.2 K/UL (ref 1–4.8)
LYMPHOCYTES NFR BLD: 36.2 % (ref 22–41)
MCH RBC QN AUTO: 29.7 PG (ref 27–33)
MCHC RBC AUTO-ENTMCNC: 32 G/DL (ref 32.2–35.5)
MCV RBC AUTO: 92.9 FL (ref 81.4–97.8)
MONOCYTES # BLD AUTO: 0.96 K/UL (ref 0–0.85)
MONOCYTES NFR BLD AUTO: 8.3 % (ref 0–13.4)
NEUTROPHILS # BLD AUTO: 5.55 K/UL (ref 1.82–7.42)
NEUTROPHILS NFR BLD: 47.8 % (ref 44–72)
NRBC # BLD AUTO: 0 K/UL
NRBC BLD-RTO: 0 /100 WBC (ref 0–0.2)
PLATELET # BLD AUTO: 319 K/UL (ref 164–446)
PMV BLD AUTO: 10.3 FL (ref 9–12.9)
POTASSIUM SERPL-SCNC: 4 MMOL/L (ref 3.6–5.5)
RBC # BLD AUTO: 3.97 M/UL (ref 4.2–5.4)
SODIUM SERPL-SCNC: 139 MMOL/L (ref 135–145)
WBC # BLD AUTO: 11.6 K/UL (ref 4.8–10.8)

## 2024-09-09 PROCEDURE — RXMED WILLOW AMBULATORY MEDICATION CHARGE: Performed by: NURSE PRACTITIONER

## 2024-09-09 PROCEDURE — A9270 NON-COVERED ITEM OR SERVICE: HCPCS | Performed by: NURSE PRACTITIONER

## 2024-09-09 PROCEDURE — 97535 SELF CARE MNGMENT TRAINING: CPT

## 2024-09-09 PROCEDURE — 80048 BASIC METABOLIC PNL TOTAL CA: CPT

## 2024-09-09 PROCEDURE — 700102 HCHG RX REV CODE 250 W/ 637 OVERRIDE(OP): Performed by: REGISTERED NURSE

## 2024-09-09 PROCEDURE — 71045 X-RAY EXAM CHEST 1 VIEW: CPT

## 2024-09-09 PROCEDURE — 700111 HCHG RX REV CODE 636 W/ 250 OVERRIDE (IP): Mod: JZ | Performed by: SURGERY

## 2024-09-09 PROCEDURE — 36415 COLL VENOUS BLD VENIPUNCTURE: CPT

## 2024-09-09 PROCEDURE — 85025 COMPLETE CBC W/AUTO DIFF WBC: CPT

## 2024-09-09 PROCEDURE — 99232 SBSQ HOSP IP/OBS MODERATE 35: CPT | Performed by: NURSE PRACTITIONER

## 2024-09-09 PROCEDURE — A9270 NON-COVERED ITEM OR SERVICE: HCPCS | Performed by: SURGERY

## 2024-09-09 PROCEDURE — 700102 HCHG RX REV CODE 250 W/ 637 OVERRIDE(OP): Performed by: NURSE PRACTITIONER

## 2024-09-09 PROCEDURE — A9270 NON-COVERED ITEM OR SERVICE: HCPCS | Performed by: REGISTERED NURSE

## 2024-09-09 PROCEDURE — 700102 HCHG RX REV CODE 250 W/ 637 OVERRIDE(OP): Performed by: SURGERY

## 2024-09-09 RX ORDER — OXYCODONE HYDROCHLORIDE 5 MG/1
5 TABLET ORAL EVERY 4 HOURS PRN
Qty: 30 TABLET | Refills: 0 | Status: SHIPPED | OUTPATIENT
Start: 2024-09-09 | End: 2024-09-16

## 2024-09-09 RX ORDER — LIDOCAINE 4 G/G
1-3 PATCH TOPICAL EVERY 24 HOURS
COMMUNITY
Start: 2024-09-09

## 2024-09-09 RX ORDER — ASPIRIN 81 MG/1
81 TABLET ORAL 2 TIMES DAILY
COMMUNITY
Start: 2024-09-09 | End: 2024-10-09

## 2024-09-09 RX ORDER — GABAPENTIN 400 MG/1
CAPSULE ORAL
Qty: 42 CAPSULE | Refills: 0 | Status: SHIPPED | OUTPATIENT
Start: 2024-09-09 | End: 2024-09-30

## 2024-09-09 RX ORDER — METHOCARBAMOL 750 MG/1
750 TABLET, FILM COATED ORAL 4 TIMES DAILY PRN
Qty: 40 TABLET | Refills: 0 | Status: SHIPPED | OUTPATIENT
Start: 2024-09-09

## 2024-09-09 RX ORDER — ACETAMINOPHEN 500 MG
1000 TABLET ORAL EVERY 6 HOURS PRN
COMMUNITY
Start: 2024-09-09

## 2024-09-09 RX ADMIN — ENOXAPARIN SODIUM 40 MG: 100 INJECTION SUBCUTANEOUS at 05:05

## 2024-09-09 RX ADMIN — OXYCODONE HYDROCHLORIDE 5 MG: 5 TABLET ORAL at 08:48

## 2024-09-09 RX ADMIN — METAXALONE 800 MG: 800 TABLET ORAL at 12:08

## 2024-09-09 RX ADMIN — GABAPENTIN 400 MG: 400 CAPSULE ORAL at 05:06

## 2024-09-09 RX ADMIN — DOCUSATE SODIUM 100 MG: 100 CAPSULE, LIQUID FILLED ORAL at 05:06

## 2024-09-09 RX ADMIN — OXYCODONE HYDROCHLORIDE 5 MG: 5 TABLET ORAL at 15:41

## 2024-09-09 RX ADMIN — METAXALONE 800 MG: 800 TABLET ORAL at 17:53

## 2024-09-09 RX ADMIN — OXYCODONE HYDROCHLORIDE 10 MG: 10 TABLET ORAL at 05:06

## 2024-09-09 RX ADMIN — NICOTINE TRANSDERMAL SYSTEM 21 MG: 21 PATCH, EXTENDED RELEASE TRANSDERMAL at 05:05

## 2024-09-09 RX ADMIN — GABAPENTIN 400 MG: 400 CAPSULE ORAL at 12:08

## 2024-09-09 RX ADMIN — OXYCODONE HYDROCHLORIDE 10 MG: 10 TABLET ORAL at 01:52

## 2024-09-09 RX ADMIN — METAXALONE 800 MG: 800 TABLET ORAL at 05:06

## 2024-09-09 RX ADMIN — OXYCODONE HYDROCHLORIDE 5 MG: 5 TABLET ORAL at 12:08

## 2024-09-09 ASSESSMENT — COGNITIVE AND FUNCTIONAL STATUS - GENERAL
PERSONAL GROOMING: A LITTLE
SUGGESTED CMS G CODE MODIFIER MOBILITY: CK
DRESSING REGULAR LOWER BODY CLOTHING: A LOT
DRESSING REGULAR UPPER BODY CLOTHING: A LITTLE
DAILY ACTIVITIY SCORE: 17
STANDING UP FROM CHAIR USING ARMS: A LITTLE
MOBILITY SCORE: 17
MOVING TO AND FROM BED TO CHAIR: A LITTLE
WALKING IN HOSPITAL ROOM: A LITTLE
TOILETING: A LITTLE
SUGGESTED CMS G CODE MODIFIER DAILY ACTIVITY: CK
TURNING FROM BACK TO SIDE WHILE IN FLAT BAD: A LITTLE
HELP NEEDED FOR BATHING: A LOT
MOVING FROM LYING ON BACK TO SITTING ON SIDE OF FLAT BED: A LITTLE
CLIMB 3 TO 5 STEPS WITH RAILING: A LOT

## 2024-09-09 ASSESSMENT — ENCOUNTER SYMPTOMS
MYALGIAS: 1
ROS GI COMMENTS: BM 9/7
RESPIRATORY NEGATIVE: 1
PSYCHIATRIC NEGATIVE: 1
NEUROLOGICAL NEGATIVE: 1
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
## 2024-09-09 NOTE — CARE PLAN
The patient is Stable - Low risk of patient condition declining or worsening    Shift Goals  Clinical Goals: wean O2, obtain proper DME, pain control  Patient Goals: go home, pain control, comfort  Family Goals: n/a    Progress made toward(s) clinical / shift goals:      Problem: Knowledge Deficit - Standard  Goal: Patient and family/care givers will demonstrate understanding of plan of care, disease process/condition, diagnostic tests and medications  Outcome: Progressing     Problem: Pain - Standard  Goal: Alleviation of pain or a reduction in pain to the patient’s comfort goal  Outcome: Progressing     Problem: Fall Risk  Goal: Patient will remain free from falls  Outcome: Progressing     Problem: Skin Integrity  Goal: Skin integrity is maintained or improved  Outcome: Progressing     Problem: Psychosocial  Goal: Patient and family will demonstrate ability to cope with life altering diagnosis and/or procedure  Outcome: Progressing     Problem: Hemodynamics  Goal: Patient's hemodynamics, fluid balance and neurologic status will be stable or improve  Outcome: Progressing       Patient is not progressing towards the following goals:      Problem: Respiratory  Goal: Patient will achieve/maintain optimum respiratory ventilation and gas exchange  Outcome: Not Progressing

## 2024-09-09 NOTE — CARE PLAN
The patient is Stable - Low risk of patient condition declining or worsening    Shift Goals  Clinical Goals: home O2 tank, WC, pain control  Patient Goals: discharge  Family Goals: n/a    Progress made toward(s) clinical / shift goals:    Problem: Knowledge Deficit - Standard  Goal: Patient and family/care givers will demonstrate understanding of plan of care, disease process/condition, diagnostic tests and medications  Outcome: Progressing  POC discussed-pt verbalized understanding.   Problem: Pain - Standard  Goal: Alleviation of pain or a reduction in pain to the patient’s comfort goal  Outcome: Progressing  Oxy given PRN with +results. Educated pt on importance of pain control- pt verbalized understanding.      Patient is not progressing towards the following goals:

## 2024-09-09 NOTE — PROGRESS NOTES
Virtual Nurse rounding complete.    Round Needs: No needs at this time. Patient comfortable in bed waiting to be helped to bathroom stuff already aware.
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
## 2024-09-09 NOTE — DISCHARGE PLANNING
1116  Referral sent to [facility] for O2 and [facility] for wheelchair.    1255  Agency/Facility Name: [facility]  Spoke To: Sergo   Outcome: DPA called to request company only send tanks for transport to home. Company requested orders be faxed to local branch. Fax number is [phone].     1698  Referral manually faxed to [facility].    2266  Agency/Facility Name: [facility]  Spoke To: Ratna  Outcome: DPA received call from company declining pt due to insurance and not servicing the area.

## 2024-09-09 NOTE — DISCHARGE PLANNING
"1010: Writer called [facility] at [phone] and requested to be transferred to [facility], Clary MCGEE, was then sent to [employer]. RN CM requested call back from Clary regarding delivery of DME/Oxygen as patient is medically cleared to discharge home once DME/oxygen is delivered.   Pending call back, avoidable days updated in Epic.   1110: Writer received voice mail from Clary with [facility] who stated patient's oxygen is authorized through [employer].   Writer sent choice form for oxygen: 1. [facility], [facility]: 1. [facility] to [facility] to send referrals.   1312: Writer faxed completed disability paperwork to patient's job \"[employer]\" at [phone] with confirmation.   1556: Oxygen delivered to bedside by [employer]. FWW delivered to patient's bedside by traction. She will transport home today, 9/9/2024, via private transportation from friend.   Anticipating no further needs from case management.   "

## 2024-09-10 ENCOUNTER — TELEPHONE (OUTPATIENT)
Dept: HEALTH INFORMATION MANAGEMENT | Facility: OTHER | Age: 49
End: 2024-09-10
Payer: COMMERCIAL

## 2024-09-10 NOTE — PROGRESS NOTES
Naomi Hoyosey has been provided discharge instructions, to include follow up care, home medications, and activity/diet reviewed. Understanding verbalized. IV removed. Catheter tip intact, bleeding controlled. Arm band removed. Medications to be given from pharmacy. Pt remains on dcl o2 concentrator while awaiting ride, does have personal o2 for transport home. Pt out of DCL at this time with personal belongings.    1855--pt notified RN that her ride will not be here until maybe 1930. She states she had told her floor team this. Pt remains on dcl concentrator at this time. NAM notified of situation.    1909--per NAM, take pt to the ER to wait on o2 for ride.      1916--while getting pt ready to go to ER, ride showed up. Pt out of DCL to ride with personal o2 tank at this time.

## (undated) DEVICE — SUTURE GENERAL

## (undated) DEVICE — DRAPE C ARMOR (12EA/CA)

## (undated) DEVICE — SUTURE 0 VICRYL PLUS CTX - 36 INCH (36/BX)

## (undated) DEVICE — SUTURE 2-0 VICRYL PLUS CT-1 36 (36PK/BX)"

## (undated) DEVICE — BLADE SURGICAL #10 - (50/BX)

## (undated) DEVICE — GOWN SURGEONS X-LARGE - DISP. (30/CA)

## (undated) DEVICE — DRAPE U ORTHOPEDIC - (10/BX)

## (undated) DEVICE — DRAPE LARGE 3 QUARTER - (20/CA)

## (undated) DEVICE — WRAP COBAN SELF-ADHERENT 6 IN X 5YDS STERILE TAN (12/CA)

## (undated) DEVICE — PAD PREP 24 X 48 CUFFED - (100/CA)

## (undated) DEVICE — MASK AIRWAY FLEXIBLE SINGLE-USE SIZE 4 ADULTS (10EA/BX)

## (undated) DEVICE — SET EXTENSION WITH 2 PORTS (48EA/CA) ***PART #2C8610 IS A SUBSTITUTE*****

## (undated) DEVICE — SENSOR OXIMETER ADULT SPO2 RD SET (20EA/BX)

## (undated) DEVICE — CHLORAPREP 26 ML APPLICATOR - ORANGE TINT(25/CA)

## (undated) DEVICE — TOWELS CLOTH SURGICAL - (4/PK 20PK/CA)

## (undated) DEVICE — GLOVE BIOGEL INDICATOR SZ 7.5 SURGICAL PF LTX - (50PR/BX 4BX/CA)

## (undated) DEVICE — BOVIE BLADE COATED - (50/PK)

## (undated) DEVICE — TOURNIQUET CUFF 34 X 4 ONE PORT DISP - STERILE (10/BX)

## (undated) DEVICE — TUBING CLEARLINK DUO-VENT - C-FLO (48EA/CA)

## (undated) DEVICE — PAD LAP STERILE 18 X 18 - (5/PK 40PK/CA)

## (undated) DEVICE — GLOVE BIOGEL PI INDICATOR SZ 6.5 SURGICAL PF LF - (50/BX 4BX/CA)

## (undated) DEVICE — DRAPE SURG STERI-DRAPE 7X11OD - (40EA/CA)

## (undated) DEVICE — LACTATED RINGERS INJ 1000 ML - (14EA/CA 60CA/PF)

## (undated) DEVICE — COVER LIGHT HANDLE ALC PLUS DISP (18EA/BX)

## (undated) DEVICE — BAG SPONGE COUNT 10.25 X 32 - BLUE (250/CA)

## (undated) DEVICE — SUTURE ETHILON 2-0 FSLX 30 (36PK/BX)"

## (undated) DEVICE — CANISTER SUCTION 3000ML MECHANICAL FILTER AUTO SHUTOFF MEDI-VAC NONSTERILE LF DISP (40EA/CA)

## (undated) DEVICE — SUCTION INSTRUMENT YANKAUER BULBOUS TIP W/O VENT (50EA/CA)

## (undated) DEVICE — GOWN WARMING STANDARD FLEX - (30/CA)

## (undated) DEVICE — DRAPE 36X28IN RAD CARM BND BG - (25/CA) O

## (undated) DEVICE — ELECTRODE DUAL RETURN W/ CORD - (50/PK)

## (undated) DEVICE — PACK LOWER EXTREMITY - (2/CA)

## (undated) DEVICE — SET LEADWIRE 5 LEAD BEDSIDE DISPOSABLE ECG (1SET OF 5/EA)

## (undated) DEVICE — GLOVE BIOGEL PI ORTHO SZ 7.5 PF LF (40PR/BX)

## (undated) DEVICE — SODIUM CHL IRRIGATION 0.9% 1000ML (12EA/CA)